# Patient Record
Sex: FEMALE | Race: WHITE | NOT HISPANIC OR LATINO | Employment: OTHER | RURAL
[De-identification: names, ages, dates, MRNs, and addresses within clinical notes are randomized per-mention and may not be internally consistent; named-entity substitution may affect disease eponyms.]

---

## 2017-06-07 ENCOUNTER — OFFICE VISIT (OUTPATIENT)
Dept: CARDIOLOGY | Facility: CLINIC | Age: 62
End: 2017-06-07

## 2017-06-07 VITALS
DIASTOLIC BLOOD PRESSURE: 82 MMHG | WEIGHT: 169 LBS | RESPIRATION RATE: 20 BRPM | HEART RATE: 65 BPM | BODY MASS INDEX: 31.1 KG/M2 | HEIGHT: 62 IN | SYSTOLIC BLOOD PRESSURE: 160 MMHG

## 2017-06-07 DIAGNOSIS — I05.1 RHEUMATIC MITRAL REGURGITATION: Primary | ICD-10-CM

## 2017-06-07 DIAGNOSIS — I49.3 FREQUENT PVCS: ICD-10-CM

## 2017-06-07 DIAGNOSIS — I05.0 RHEUMATIC MITRAL STENOSIS: ICD-10-CM

## 2017-06-07 PROCEDURE — 99213 OFFICE O/P EST LOW 20 MIN: CPT | Performed by: INTERNAL MEDICINE

## 2017-06-07 RX ORDER — ASPIRIN 81 MG/1
81 TABLET ORAL DAILY
COMMUNITY
End: 2023-01-12

## 2017-06-07 NOTE — PROGRESS NOTES
"Date of Office Visit: 2017  Encounter Provider: Georges Esqueda MD  Place of Service: [facility] CARDIOLOGY  Patient Name: Yashira Macias  :1955    Chief complaint: Follow-up for mitral regurgitation, mitral stenosis, and frequent PVC's    History of Present Illness:    Dear Dr. Bledsoe:      I again had the pleasure of seeing your patient in cardiology office on 2017.  As you   well know, she is a very pleasant 62 year-old white female with a past medical history   significant for mitral valve stenosis, mitral regurgitation, and frequent PVCs who presents   for follow-up. The patient initially saw me on 2016. She had been experiencing   palpitations which were mild in nature and she described these as if her heart were   \"skipping beats.\" She had not had any chest discomfort or shortness of breath. She   underwent an exercise treadmill stress test and 2D echocardiogram on 2016. Her   stress test showed horizontal ST depression suggestive of ischemia. Her   echocardiogram showed possible mitral stenosis with a peak gradient of 25 mmHg and a   mean gradient of 11 mmHg. She also had a Holter monitor performed on 2016   which showed very frequent PVCs with a total of 12,909 isolated events. She also had 2   very brief runs of SVT, 8 and 10 beats respectively. She underwent a transesophageal   echocardiogram to further evaluate the mitral valve on 2016. This showed an   ejection fraction of 60% to 65% with grade 2 diastolic dysfunction. However, the posterior   mitral valve leaflet was tethered and fixed, resulting in malcoaptation of the mitral valve   leaflets. She had moderate to severe mitral regurgitation, and significant mitral stenosis   with a mean gradient of 9.5 mmHg. The degree of mitral stenosis was felt to be   overestimated secondary to the mitral regurgitation. She also had a likely fibroelastoma   on the noncoronary cusp of the " aortic valve, as well as a calculated right ventricular   systolic pressure of 65 mmHg.       The patient presents today for follow-up.  She has had no palpitations or increasing   shortness of breath.  She has had no chest pain.  Overall, she has been doing well.    Past Medical History:   Diagnosis Date   • Frequent PVCs    • Hypothyroidism    • Mitral regurgitation     Moderate to severe MR by DAO on 5/4/16   • Mitral stenosis     Moderate to severe by DAO 5/4/16 - mean gradient 9.5 mm Hg (possibly falsely elevated secondary to MR).    • Papillary fibroelastoma of heart     Small fibroelastoma attached to left coronary cusp of arotic valve by DAO 5/4/16       Past Surgical History:   Procedure Laterality Date   • CARPAL TUNNEL RELEASE     • HYSTERECTOMY         Current Outpatient Prescriptions on File Prior to Visit   Medication Sig Dispense Refill   • amitriptyline (ELAVIL) 150 MG tablet TAKE 1 TABLET BY MOUTH AT BEDTIME  3   • atenolol (TENORMIN) 25 MG tablet 25 mg 2 (Two) Times a Day.  1   • levothyroxine (SYNTHROID, LEVOTHROID) 75 MCG tablet TAKE 1 TABLET BY MOUTH EVERY DAY  3     No current facility-administered medications on file prior to visit.      Allergies as of 06/07/2017 - Abelardo as Reviewed 06/07/2017   Allergen Reaction Noted   • Sulfa antibiotics  04/20/2016     Social History     Social History   • Marital status:      Spouse name: N/A   • Number of children: N/A   • Years of education: N/A     Occupational History   • Not on file.     Social History Main Topics   • Smoking status: Current Every Day Smoker     Packs/day: 1.00   • Smokeless tobacco: Never Used      Comment: Smoked for over 40 years    • Alcohol use No   • Drug use: No   • Sexual activity: Not on file     Other Topics Concern   • Not on file     Social History Narrative     Family History   Problem Relation Age of Onset   • Diabetes Brother        Review of Systems   All other systems reviewed and are negative.    Objective:  "    Vitals:    06/07/17 1223   BP: 160/82   Pulse: 65   Resp: 20   Weight: 169 lb (76.7 kg)   Height: 62\" (157.5 cm)     Body mass index is 30.91 kg/(m^2).    Physical Exam   Constitutional: She is oriented to person, place, and time. She appears well-developed and well-nourished.   HENT:   Head: Normocephalic and atraumatic.   Eyes: Conjunctivae are normal.   Neck: Neck supple.   Cardiovascular: Normal rate and regular rhythm.  Exam reveals no gallop and no friction rub.    Murmur heard.   Harsh systolic murmur is present with a grade of 2/6  at the lower left sternal border, apex  Pulmonary/Chest: Effort normal and breath sounds normal.   Abdominal: Soft. There is no tenderness.   Musculoskeletal: She exhibits no edema.   Neurological: She is alert and oriented to person, place, and time.   Skin: Skin is warm.   Psychiatric: She has a normal mood and affect. Her behavior is normal.     Lab Review:   Procedures    Cardiac Procedures:  1. 24-hour Holter monitor on 04/19/2016 showed very frequent premature ventricular   contractions with a total of 12,909 events. She did have 2 couplets and 1250 bigeminy   runs. She also had 2 short runs of SVT at 8 and 10 beats respectively.   2. Transesophageal echocardiogram on 05/04/2016 showed an ejection fraction of 60%.   There was grade 2 diastolic dysfunction. The left atrium was moderately dilated. There   was no evidence of shunting. The right ventricle was normal in size and function. The   posterior mitral valve leaflet was tethered and fixed, resulting in malcoaptation of the mitral   valve leaflets. There was moderate to severe mitral regurgitation. There was up to   moderate to severe mitral stenosis with a mean gradient of 9.5 mmHg (however, this was   possibly overestimated secondary to the mitral regurgitation). There was a small   fibroelastoma on the left coronary cusp of the aortic valve. There was mild tricuspid   regurgitation with a calculated right " ventricular systolic pressure of 65 mmHg.    3. Echocardiogram on 12/21/2016 revealed mild LVH.  The ejection fraction was 60-65%.    There was grade II diastolic dysfunction.  The left atrium was moderately dilated.  The   mitral valve leaflets were thickened and possibly rheumatic.  There was doming of the   anterior mitral valve leaflet.  There was moderate mitral regurgitation which was likely   underestimated.      Assessment:       Diagnosis Plan   1. Rheumatic mitral regurgitation     2. Rheumatic mitral stenosis     3. Frequent PVCs       Plan:       The patient seems to be fairly asymptomatic at this time.  She has had no increasing   shortness of breath, and is still able to exert  herself without any difficulty.  She has   not had any significant palpitations.  Her echocardiogram in 12/2016 revealed   moderate mitral stenosis and at least moderate mitral regurgitation.  I am going to   plan on another echocardiogram in approximately six months to reassess this.  Her   mitral valve does appear to be rheumatic.  She will continue on the atenolol at 25 mg   twice a day for now.  I do not feel the PVCs are causing any issues currently.  I will   see her back in six months, with an echocardiogram shortly afterwards.

## 2017-11-28 DIAGNOSIS — I34.2 NON-RHEUMATIC MITRAL VALVE STENOSIS: Primary | ICD-10-CM

## 2017-11-28 DIAGNOSIS — I05.0 MITRAL VALVE STENOSIS, UNSPECIFIED ETIOLOGY: ICD-10-CM

## 2017-11-28 DIAGNOSIS — I34.0 MITRAL VALVE INSUFFICIENCY, UNSPECIFIED ETIOLOGY: ICD-10-CM

## 2017-12-20 ENCOUNTER — APPOINTMENT (OUTPATIENT)
Dept: CARDIOLOGY | Facility: HOSPITAL | Age: 62
End: 2017-12-20
Attending: INTERNAL MEDICINE

## 2017-12-20 ENCOUNTER — OFFICE VISIT (OUTPATIENT)
Dept: CARDIOLOGY | Facility: CLINIC | Age: 62
End: 2017-12-20

## 2017-12-20 VITALS
HEART RATE: 77 BPM | WEIGHT: 173.8 LBS | DIASTOLIC BLOOD PRESSURE: 78 MMHG | BODY MASS INDEX: 31.98 KG/M2 | SYSTOLIC BLOOD PRESSURE: 148 MMHG | HEIGHT: 62 IN

## 2017-12-20 DIAGNOSIS — I05.1 RHEUMATIC MITRAL REGURGITATION: Primary | ICD-10-CM

## 2017-12-20 DIAGNOSIS — I05.0 RHEUMATIC MITRAL STENOSIS: ICD-10-CM

## 2017-12-20 DIAGNOSIS — I49.3 FREQUENT PVCS: ICD-10-CM

## 2017-12-20 PROCEDURE — 99214 OFFICE O/P EST MOD 30 MIN: CPT | Performed by: INTERNAL MEDICINE

## 2017-12-20 RX ORDER — METFORMIN HYDROCHLORIDE 500 MG/1
TABLET, EXTENDED RELEASE ORAL
Refills: 11 | Status: ON HOLD | COMMUNITY
Start: 2017-12-01 | End: 2021-05-06

## 2017-12-23 NOTE — PROGRESS NOTES
"Date of Office Visit: 2017  Encounter Provider: Georges Esqueda MD  Place of Service: [facility] CARDIOLOGY  Patient Name: Yashira Macias  :1955    Chief complaint: Follow-up for mitral regurgitation, mitral stenosis, and frequent   PVC's    History of Present Illness:    Dear Dr. Bledsoe:      I again had the pleasure of seeing your patient in cardiology office on 2017.  As you   well know, she is a very pleasant 62 year-old white female with a past medical history   significant for mitral valve stenosis, mitral regurgitation, and frequent PVCs who presents   for follow-up. The patient initially saw me on 2016. She had been experiencing   palpitations which were mild in nature and she described these as if her heart were   \"skipping beats.\" She had not had any chest discomfort or shortness of breath. She   underwent an exercise treadmill stress test and 2D echocardiogram on 2016. Her   stress test showed horizontal ST depression suggestive of ischemia. Her   echocardiogram showed possible mitral stenosis with a peak gradient of 25 mmHg and a   mean gradient of 11 mmHg. She also had a Holter monitor performed on 2016   which showed very frequent PVCs with a total of 12,909 isolated events. She also had 2   very brief runs of SVT, 8 and 10 beats respectively. She underwent a transesophageal   echocardiogram to further evaluate the mitral valve on 2016. This showed an   ejection fraction of 60% to 65% with grade 2 diastolic dysfunction. However, the posterior   mitral valve leaflet was tethered and fixed, resulting in malcoaptation of the mitral valve   leaflets. She had moderate to severe mitral regurgitation, and significant mitral stenosis   with a mean gradient of 9.5 mmHg. The degree of mitral stenosis was felt to be   overestimated secondary to the mitral regurgitation. She also had a likely fibroelastoma   on the noncoronary cusp of " the aortic valve, as well as a calculated right ventricular   systolic pressure of 65 mmHg.       The patient presents today for follow-up.  Overall, she is doing well.  She still denies any   significant dyspnea on exertion.  She has not had any significant palpitations.  She is   asymptomatic with regards to the PVCs.  She also denied any chest pain.    Past Medical History:   Diagnosis Date   • Frequent PVCs    • Hypothyroidism    • Mitral regurgitation     Moderate to severe MR by DAO on 5/4/16   • Mitral stenosis     Moderate to severe by DAO 5/4/16 - mean gradient 9.5 mm Hg (possibly falsely elevated secondary to MR).    • Papillary fibroelastoma of heart     Small fibroelastoma attached to left coronary cusp of arotic valve by DAO 5/4/16       Past Surgical History:   Procedure Laterality Date   • CARPAL TUNNEL RELEASE     • HYSTERECTOMY         Current Outpatient Prescriptions on File Prior to Visit   Medication Sig Dispense Refill   • amitriptyline (ELAVIL) 150 MG tablet TAKE 1 TABLET BY MOUTH AT BEDTIME  3   • aspirin 81 MG EC tablet Take 81 mg by mouth Daily.     • atenolol (TENORMIN) 25 MG tablet 25 mg 2 (Two) Times a Day.  1   • levothyroxine (SYNTHROID, LEVOTHROID) 75 MCG tablet TAKE 1 TABLET BY MOUTH EVERY DAY  3     No current facility-administered medications on file prior to visit.      Allergies as of 12/20/2017 - Abelardo as Reviewed 12/20/2017   Allergen Reaction Noted   • Sulfa antibiotics Hives 04/20/2016     Social History     Social History   • Marital status:      Spouse name: N/A   • Number of children: N/A   • Years of education: N/A     Occupational History   • Not on file.     Social History Main Topics   • Smoking status: Current Every Day Smoker     Packs/day: 1.00   • Smokeless tobacco: Never Used      Comment: Smoked for over 40 years    • Alcohol use No      Comment: 1 can caffeine daily   • Drug use: No   • Sexual activity: Not on file     Other Topics Concern   • Not on file  "    Social History Narrative     Family History   Problem Relation Age of Onset   • Diabetes Brother        Review of Systems   Constitution: Positive for malaise/fatigue.   All other systems reviewed and are negative.    Objective:     Vitals:    12/20/17 1003   BP: 148/78   BP Location: Left arm   Pulse: 77   Weight: 78.8 kg (173 lb 12.8 oz)   Height: 157.5 cm (62\")     Body mass index is 31.79 kg/(m^2).    Physical Exam   Constitutional: She is oriented to person, place, and time. She appears well-developed and well-nourished.   HENT:   Head: Normocephalic and atraumatic.   Eyes: Conjunctivae are normal.   Neck: Neck supple.   Cardiovascular: Normal rate and regular rhythm.  Exam reveals no gallop and no friction rub.    Murmur heard.   Harsh systolic murmur is present with a grade of 2/6  at the lower left sternal border, apex  Pulmonary/Chest: Effort normal and breath sounds normal.   Abdominal: Soft. There is no tenderness.   Musculoskeletal: She exhibits no edema.   Neurological: She is alert and oriented to person, place, and time.   Skin: Skin is warm.   Psychiatric: She has a normal mood and affect. Her behavior is normal.     Lab Review:   Procedures    Cardiac Procedures:  1. 24-hour Holter monitor on 04/19/2016 showed very frequent premature ventricular   contractions with a total of 12,909 events. She did have 2 couplets and 1250 bigeminy   runs. She also had 2 short runs of SVT at 8 and 10 beats respectively.   2. Transesophageal echocardiogram on 05/04/2016 showed an ejection fraction of 60%.   There was grade 2 diastolic dysfunction. The left atrium was moderately dilated. There   was no evidence of shunting. The right ventricle was normal in size and function. The   posterior mitral valve leaflet was tethered and fixed, resulting in malcoaptation of the mitral   valve leaflets. There was moderate to severe mitral regurgitation. There was up to   moderate to severe mitral stenosis with a mean " gradient of 9.5 mmHg (however, this was   possibly overestimated secondary to the mitral regurgitation). There was a small   fibroelastoma on the left coronary cusp of the aortic valve. There was mild tricuspid   regurgitation with a calculated right ventricular systolic pressure of 65 mmHg.    3. Echocardiogram on 12/21/2016 revealed mild LVH.  The ejection fraction was 60-65%.    There was grade II diastolic dysfunction.  The left atrium was moderately dilated.  The   mitral valve leaflets were thickened and possibly rheumatic.  There was doming of the   anterior mitral valve leaflet.  There was moderate mitral regurgitation which was likely   underestimated.      Assessment:       Diagnosis Plan   1. Rheumatic mitral regurgitation     2. Rheumatic mitral stenosis     3. Frequent PVCs       Plan:       The patient is doing well at this point.  She is completely asymptomatic from the PVCs.    She also does not have any symptoms from her mitral valve disease.  She had an   echocardiogram performed earlier today, and I am going to review this in detail.  For now,   I will keep her on the atenolol at 25 mg twice a day.  Her heart rate and blood pressure   have been good at home per her account.  Again, I do suspect that she likely has had   rheumatic fever in the past, although she is not aware of this.  At this point, unless there   are issues with her left ventricle, there is no indication for surgical intervention on the   mitral valve.  However, this will need to be watched closely.  I will plan on seeing her   back in the office in 6 months unless other issues arise.

## 2018-06-20 ENCOUNTER — OFFICE VISIT (OUTPATIENT)
Dept: CARDIOLOGY | Facility: CLINIC | Age: 63
End: 2018-06-20

## 2018-06-20 VITALS
WEIGHT: 171 LBS | BODY MASS INDEX: 31.47 KG/M2 | HEART RATE: 68 BPM | SYSTOLIC BLOOD PRESSURE: 132 MMHG | DIASTOLIC BLOOD PRESSURE: 62 MMHG | HEIGHT: 62 IN

## 2018-06-20 DIAGNOSIS — I05.1 RHEUMATIC MITRAL REGURGITATION: Primary | ICD-10-CM

## 2018-06-20 DIAGNOSIS — I49.3 FREQUENT PVCS: ICD-10-CM

## 2018-06-20 DIAGNOSIS — I05.0 RHEUMATIC MITRAL STENOSIS: ICD-10-CM

## 2018-06-20 PROCEDURE — 99213 OFFICE O/P EST LOW 20 MIN: CPT | Performed by: INTERNAL MEDICINE

## 2018-06-20 RX ORDER — LEVOTHYROXINE SODIUM 150 UG/1
150 CAPSULE ORAL DAILY
COMMUNITY
End: 2019-12-04 | Stop reason: DRUGHIGH

## 2018-06-20 NOTE — PROGRESS NOTES
"Date of Office Visit: 2018  Encounter Provider: Georges Esqueda MD  Place of Service: [facility] CARDIOLOGY  Patient Name: Yashira Macias  :1955    Chief complaint: Follow-up for rheumatic mitral regurgitation and mitral stenosis,   frequent PVCs.    History of Present Illness:        I again had the pleasure of seeing your patient in cardiology office on 2018.  As you   well know, she is a very pleasant 63 year-old white female with a past medical history   significant for mitral valve stenosis, mitral regurgitation, and frequent PVCs who presents   for follow-up. The patient initially saw me on 2016. She had been experiencing   palpitations which were mild in nature and she described these as if her heart were   \"skipping beats.\" She had not had any chest discomfort or shortness of breath. She   underwent an exercise treadmill stress test and 2D echocardiogram on 2016. Her   stress test showed horizontal ST depression suggestive of ischemia. Her   echocardiogram showed possible mitral stenosis with a peak gradient of 25 mmHg and a   mean gradient of 11 mmHg. She also had a Holter monitor performed on 2016   which showed very frequent PVCs with a total of 12,909 isolated events. She also had 2   very brief runs of SVT, 8 and 10 beats respectively. She underwent a transesophageal   echocardiogram to further evaluate the mitral valve on 2016. This showed an   ejection fraction of 60% to 65% with grade 2 diastolic dysfunction. However, the posterior   mitral valve leaflet was tethered and fixed, resulting in malcoaptation of the mitral valve   leaflets. She had moderate to severe mitral regurgitation, and significant mitral stenosis   with a mean gradient of 9.5 mmHg. The degree of mitral stenosis was felt to be   overestimated secondary to the mitral regurgitation. She also had a likely fibroelastoma   on the noncoronary cusp of the " aortic valve, as well as a calculated right ventricular   systolic pressure of 65 mmHg.       The patient presents today for follow-up.  She is still asymptomatic, and has had no   significant shortness of breath or palpitations.  She denied any chest discomfort.  She   does state that her thyroid medications are currently being adjusted, and she has had   some fatigue from this.    Past Medical History:   Diagnosis Date   • Frequent PVCs    • Hypothyroidism    • Mitral regurgitation     Moderate to severe MR by DAO on 5/4/16   • Mitral stenosis     Moderate to severe by DAO 5/4/16 - mean gradient 9.5 mm Hg (possibly falsely elevated secondary to MR).    • Papillary fibroelastoma of heart     Small fibroelastoma attached to left coronary cusp of arotic valve by DAO 5/4/16       Past Surgical History:   Procedure Laterality Date   • CARPAL TUNNEL RELEASE     • HYSTERECTOMY         Current Outpatient Prescriptions on File Prior to Visit   Medication Sig Dispense Refill   • amitriptyline (ELAVIL) 150 MG tablet TAKE 1 TABLET BY MOUTH AT BEDTIME  3   • aspirin 81 MG EC tablet Take 81 mg by mouth Daily.     • atenolol (TENORMIN) 25 MG tablet 25 mg 2 (Two) Times a Day.  1   • metFORMIN ER (GLUCOPHAGE-XR) 500 MG 24 hr tablet TAKE 1 TABLET BY MOUTH EVERY MORNING  11   • [DISCONTINUED] levothyroxine (SYNTHROID, LEVOTHROID) 75 MCG tablet 125 mcg. TAKE 1 TABLET BY MOUTH EVERY DAY  3     No current facility-administered medications on file prior to visit.      Allergies as of 06/20/2018 - Reviewed 06/20/2018   Allergen Reaction Noted   • Sulfa antibiotics Hives 04/20/2016     Social History     Social History   • Marital status:      Spouse name: N/A   • Number of children: N/A   • Years of education: N/A     Occupational History   • Not on file.     Social History Main Topics   • Smoking status: Current Every Day Smoker     Packs/day: 1.00   • Smokeless tobacco: Never Used      Comment: Smoked for over 40 years    •  "Alcohol use No      Comment: 1 can caffeine daily   • Drug use: No   • Sexual activity: Not on file     Other Topics Concern   • Not on file     Social History Narrative   • No narrative on file     Family History   Problem Relation Age of Onset   • Diabetes Brother        Review of Systems   Constitution: Positive for malaise/fatigue.   All other systems reviewed and are negative.     Objective:     Vitals:    06/20/18 0910   BP: 132/62   Pulse: 68   Weight: 77.6 kg (171 lb)   Height: 157.5 cm (62\")     Body mass index is 31.28 kg/m².    Physical Exam   Constitutional: She is oriented to person, place, and time. She appears well-developed and well-nourished.   HENT:   Head: Normocephalic and atraumatic.   Eyes: Conjunctivae are normal.   Neck: Neck supple.   Cardiovascular: Normal rate and regular rhythm.  Exam reveals no gallop and no friction rub.    Murmur heard.   Systolic murmur is present with a grade of 2/6  at the lower left sternal border, apex  Pulmonary/Chest: Effort normal and breath sounds normal.   Abdominal: Soft. There is no tenderness.   Musculoskeletal: She exhibits no edema.   Neurological: She is alert and oriented to person, place, and time.   Skin: Skin is warm.   Psychiatric: She has a normal mood and affect. Her behavior is normal.     Lab Review:   Procedures    Cardiac Procedures:  1. 24-hour Holter monitor on 04/19/2016 showed very frequent premature ventricular   contractions with a total of 12,909 events. She did have 2 couplets and 1250 bigeminy   runs. She also had 2 short runs of SVT at 8 and 10 beats respectively.   2. Transesophageal echocardiogram on 05/04/2016 showed an ejection fraction of 60%.   There was grade 2 diastolic dysfunction. The left atrium was moderately dilated. There   was no evidence of shunting. The right ventricle was normal in size and function. The   posterior mitral valve leaflet was tethered and fixed, resulting in malcoaptation of the mitral   valve " leaflets. There was moderate to severe mitral regurgitation. There was up to   moderate to severe mitral stenosis with a mean gradient of 9.5 mmHg (however, this was   possibly overestimated secondary to the mitral regurgitation). There was a small   fibroelastoma on the left coronary cusp of the aortic valve. There was mild tricuspid   regurgitation with a calculated right ventricular systolic pressure of 65 mmHg.    3. Echocardiogram on 12/21/2016 revealed mild LVH.  The ejection fraction was 60-65%.    There was grade II diastolic dysfunction.  The left atrium was moderately dilated.  The   mitral valve leaflets were thickened and possibly rheumatic.  There was doming of the   anterior mitral valve leaflet.  There was moderate mitral regurgitation which was likely   underestimated.    4. Echocardiogram on 12/20/2017: There is mild LVH.  The ejection fraction is 60-65%.    There was grade 2 diastolic dysfunction.  The left atrium was moderately dilated.  The   mitral valve leaflets were thickened and rheumatic.  There was moderate mitral stenosis   with a mean gradient of 7 mmHg.  There was moderate mitral regurgitation.    Assessment:       Diagnosis Plan   1. Rheumatic mitral regurgitation     2. Rheumatic mitral stenosis     3. Frequent PVCs       Plan:       The patient is still asymptomatic at this point.  However, given the mitral valve disease and   the rheumatic changes, I would still recommend an echocardiogram every year. I am going   to have an echocardiogram checked in 6 months at her follow-up appointment.  She will   continue on the atenolol at 25 mg twice a day.  She has not had any palpitations from the   PVCs.  At this point, she is not having symptoms, and does not need surgical intervention   for her mitral valve.  However, this will need to be watched closely.  I will see her back in 6   months.

## 2018-12-05 ENCOUNTER — OFFICE VISIT (OUTPATIENT)
Dept: CARDIOLOGY | Facility: CLINIC | Age: 63
End: 2018-12-05

## 2018-12-05 VITALS
HEIGHT: 62 IN | BODY MASS INDEX: 32.2 KG/M2 | WEIGHT: 175 LBS | HEART RATE: 71 BPM | SYSTOLIC BLOOD PRESSURE: 128 MMHG | OXYGEN SATURATION: 97 % | DIASTOLIC BLOOD PRESSURE: 68 MMHG

## 2018-12-05 DIAGNOSIS — I05.1 RHEUMATIC MITRAL REGURGITATION: Primary | ICD-10-CM

## 2018-12-05 DIAGNOSIS — I49.3 FREQUENT PVCS: ICD-10-CM

## 2018-12-05 DIAGNOSIS — I05.0 RHEUMATIC MITRAL STENOSIS: ICD-10-CM

## 2018-12-05 PROCEDURE — 99213 OFFICE O/P EST LOW 20 MIN: CPT | Performed by: INTERNAL MEDICINE

## 2018-12-10 NOTE — PROGRESS NOTES
"Date of Office Visit: 2018  Encounter Provider: Georges Esqueda MD  Place of Service: [facility] CARDIOLOGY  Patient Name: Yashira Macias  :1955    Chief complaint: Follow-up for rheumatic mitral regurgitation and mitral stenosis,   frequent PVCs.    History of Present Illness:    I again had the pleasure of seeing your patient in cardiology office on 2018.  As you   well know, she is a very pleasant 63 year-old white female with a past medical history   significant for mitral valve stenosis, mitral regurgitation, and frequent PVCs who presents   for follow-up. The patient initially saw me on 2016. She had been experiencing   palpitations which were mild in nature and she described these as if her heart were   \"skipping beats.\" She had not had any chest discomfort or shortness of breath. She   underwent an exercise treadmill stress test and 2D echocardiogram on 2016. Her   stress test showed horizontal ST depression suggestive of ischemia. Her   echocardiogram showed possible mitral stenosis with a peak gradient of 25 mmHg and a   mean gradient of 11 mmHg. She also had a Holter monitor performed on 2016   which showed very frequent PVCs with a total of 12,909 isolated events. She also had 2   very brief runs of SVT, 8 and 10 beats respectively. She underwent a transesophageal   echocardiogram to further evaluate the mitral valve on 2016. This showed an   ejection fraction of 60% to 65% with grade 2 diastolic dysfunction. However, the posterior   mitral valve leaflet was tethered and fixed, resulting in malcoaptation of the mitral valve   leaflets. She had moderate to severe mitral regurgitation, and significant mitral stenosis   with a mean gradient of 9.5 mmHg. The degree of mitral stenosis was felt to be   overestimated secondary to the mitral regurgitation. She also had a likely fibroelastoma   on the noncoronary cusp of the aortic " "valve, as well as a calculated right ventricular   systolic pressure of 65 mmHg.     The patient presents today for follow-up.  She continues to be asymptomatic from the   valvular disease. She has a slight shortness of breath which has not changed significantly.    She has not felt any palpitations recently.  She does tell me that her thyroid has been \"off\",   and this is being addressed.      Past Medical History:   Diagnosis Date   • Frequent PVCs    • Hypothyroidism    • Mitral regurgitation     Moderate to severe MR by DAO on 5/4/16   • Mitral stenosis     Moderate to severe by DAO 5/4/16 - mean gradient 9.5 mm Hg (possibly falsely elevated secondary to MR).    • Papillary fibroelastoma of heart     Small fibroelastoma attached to left coronary cusp of arotic valve by DAO 5/4/16       Past Surgical History:   Procedure Laterality Date   • CARPAL TUNNEL RELEASE     • HYSTERECTOMY         Current Outpatient Medications on File Prior to Visit   Medication Sig Dispense Refill   • amitriptyline (ELAVIL) 150 MG tablet TAKE 1 TABLET BY MOUTH AT BEDTIME  3   • aspirin 81 MG EC tablet Take 81 mg by mouth Daily.     • atenolol (TENORMIN) 25 MG tablet 25 mg 2 (Two) Times a Day.  1   • levothyroxine sodium (TIROSINT) 150 MCG capsule Take 150 mcg by mouth Daily.     • metFORMIN ER (GLUCOPHAGE-XR) 500 MG 24 hr tablet TAKE 1 TABLET BY MOUTH EVERY MORNING  11     No current facility-administered medications on file prior to visit.      Allergies as of 12/05/2018 - Reviewed 06/20/2018   Allergen Reaction Noted   • Sulfa antibiotics Hives 04/20/2016     Social History     Socioeconomic History   • Marital status:      Spouse name: Not on file   • Number of children: Not on file   • Years of education: Not on file   • Highest education level: Not on file   Social Needs   • Financial resource strain: Not on file   • Food insecurity - worry: Not on file   • Food insecurity - inability: Not on file   • Transportation needs - " "medical: Not on file   • Transportation needs - non-medical: Not on file   Occupational History   • Not on file   Tobacco Use   • Smoking status: Current Every Day Smoker     Packs/day: 1.00   • Smokeless tobacco: Never Used   • Tobacco comment: Smoked for over 40 years    Substance and Sexual Activity   • Alcohol use: No     Comment: 1 can caffeine daily   • Drug use: No   • Sexual activity: Not on file   Other Topics Concern   • Not on file   Social History Narrative   • Not on file     Family History   Problem Relation Age of Onset   • Diabetes Brother        Review of Systems   Constitution: Positive for malaise/fatigue.   Respiratory: Positive for shortness of breath.    All other systems reviewed and are negative.     Objective:     Vitals:    12/05/18 0938   BP: 128/68   Pulse: 71   SpO2: 97%   Weight: 79.4 kg (175 lb)   Height: 157.5 cm (62.01\")     Body mass index is 32 kg/m².    Physical Exam   Constitutional: She is oriented to person, place, and time. She appears well-developed and well-nourished.   HENT:   Head: Normocephalic and atraumatic.   Eyes: Conjunctivae are normal.   Neck: Neck supple.   Cardiovascular: Normal rate and regular rhythm. Exam reveals no gallop and no friction rub.   Murmur heard.   Systolic murmur is present with a grade of 2/6 at the lower left sternal border and apex.  Pulmonary/Chest: Effort normal and breath sounds normal.   Abdominal: Soft. There is no tenderness.   Musculoskeletal: She exhibits no edema.   Neurological: She is alert and oriented to person, place, and time.   Skin: Skin is warm.   Psychiatric: She has a normal mood and affect. Her behavior is normal.     Lab Review:   Procedures    Cardiac Procedures:  1. 24-hour Holter monitor on 04/19/2016 showed very frequent premature ventricular   contractions with a total of 12,909 events. She did have 2 couplets and 1250 bigeminy   runs. She also had 2 short runs of SVT at 8 and 10 beats respectively.   2. " Transesophageal echocardiogram on 05/04/2016 showed an ejection fraction of 60%.   There was grade 2 diastolic dysfunction. The left atrium was moderately dilated. There   was no evidence of shunting. The right ventricle was normal in size and function. The   posterior mitral valve leaflet was tethered and fixed, resulting in malcoaptation of the mitral   valve leaflets. There was moderate to severe mitral regurgitation. There was up to   moderate to severe mitral stenosis with a mean gradient of 9.5 mmHg (however, this was   possibly overestimated secondary to the mitral regurgitation). There was a small   fibroelastoma on the left coronary cusp of the aortic valve. There was mild tricuspid   regurgitation with a calculated right ventricular systolic pressure of 65 mmHg.    3. Echocardiogram on 12/21/2016 revealed mild LVH.  The ejection fraction was 60-65%.    There was grade II diastolic dysfunction.  The left atrium was moderately dilated.  The   mitral valve leaflets were thickened and possibly rheumatic.  There was doming of the   anterior mitral valve leaflet.  There was moderate mitral regurgitation which was likely   underestimated.    4. Echocardiogram on 12/20/2017: There is mild LVH.  The ejection fraction is 60-65%.    There was grade 2 diastolic dysfunction.  The left atrium was moderately dilated.  The   mitral valve leaflets were thickened and rheumatic.  There was moderate mitral stenosis   with a mean gradient of 7 mmHg.  There was moderate mitral regurgitation.      Assessment:       Diagnosis Plan   1. Rheumatic mitral regurgitation  Adult Transthoracic Echo Complete W/ Cont if Necessary Per Protocol   2. Rheumatic mitral stenosis  Adult Transthoracic Echo Complete W/ Cont if Necessary Per Protocol   3. Frequent PVCs       Plan:       The patient continues to be fairly asymptomatic.  She has baseline shortness of breath, but   continues to smoke one pack of cigarettes per day.  The shortness of  breath has not changed   recently, and she has not had any other symptoms.  She does not feel the PVCs, and does   not have palpitations.  Her last echocardiogram showed moderate mitral stenosis and   moderate mitral regurgitation.  Given the rheumatic changes on the valve, I have   recommended a yearly echo.  She is now due for an echocardiogram, and I will have this set   up.  She will continue on the atenolol at 25 mg twice a day.  She is getting her Synthroid   adjusted to better regulate her thyroid.  I will plan on seeing her back in 6 months unless the   echocardiogram shows any new changes.

## 2019-06-05 ENCOUNTER — OFFICE VISIT (OUTPATIENT)
Dept: CARDIOLOGY | Facility: CLINIC | Age: 64
End: 2019-06-05

## 2019-06-05 VITALS
DIASTOLIC BLOOD PRESSURE: 56 MMHG | SYSTOLIC BLOOD PRESSURE: 122 MMHG | HEART RATE: 63 BPM | BODY MASS INDEX: 32.2 KG/M2 | WEIGHT: 175 LBS | HEIGHT: 62 IN

## 2019-06-05 DIAGNOSIS — I49.3 PVC'S (PREMATURE VENTRICULAR CONTRACTIONS): ICD-10-CM

## 2019-06-05 DIAGNOSIS — I05.0 RHEUMATIC MITRAL STENOSIS: ICD-10-CM

## 2019-06-05 DIAGNOSIS — R00.2 PALPITATIONS: Primary | ICD-10-CM

## 2019-06-05 DIAGNOSIS — I05.1 RHEUMATIC MITRAL REGURGITATION: ICD-10-CM

## 2019-06-05 PROCEDURE — 99214 OFFICE O/P EST MOD 30 MIN: CPT | Performed by: INTERNAL MEDICINE

## 2019-06-05 NOTE — PROGRESS NOTES
"Date of Office Visit: 2019  Encounter Provider: Georges Esqueda MD  Place of Service: [facility] CARDIOLOGY  Patient Name: Yashira Macias  :1955    Chief complaint: Follow-up for rheumatic mitral regurgitation and mitral stenosis,   frequent PVCs.    History of Present Illness:      I again had the pleasure of seeing your patient in cardiology office on 2019.  As you   well know, she is a very pleasant 64 year-old white female with a past medical history   significant for mitral valve stenosis, mitral regurgitation, and frequent PVCs who presents   for follow-up. The patient initially saw me on 2016. She had been experiencing   palpitations which were mild in nature and she described these as if her heart were   \"skipping beats.\" She had not had any chest discomfort or shortness of breath. She   underwent an exercise treadmill stress test and 2D echocardiogram on 2016. Her   stress test showed horizontal ST depression suggestive of ischemia. Her   echocardiogram showed possible mitral stenosis with a peak gradient of 25 mmHg and a   mean gradient of 11 mmHg. She also had a Holter monitor performed on 2016   which showed very frequent PVCs with a total of 12,909 isolated events. She also had 2   very brief runs of SVT, 8 and 10 beats respectively.     She underwent a transesophageal echocardiogram to further evaluate the mitral valve   on 2016. This showed an ejection fraction of 60% to 65% with grade 2 diastolic   dysfunction. However, the posterior mitral valve leaflet was tethered and fixed, resulting   in malcoaptation of the mitral valve leaflets. She had moderate to severe mitral   regurgitation, and significant mitral stenosis with a mean gradient of 9.5 mmHg. The   degree of mitral stenosis was felt to be overestimated secondary to the mitral  regurgitation. She also had a likely fibroelastoma on the noncoronary cusp of the " aortic  valve, as well as a calculated right ventricular systolic pressure of 65 mmHg.      The patient presents today for follow-up.  Her only complaint is that she did have   palpitations for about 10 to 15 minutes yesterday.  This is the first time she had felt   palpitations in quite some time.  She has not had any chest pain or new shortness of   breath.  She is still smoking.      Past Medical History:   Diagnosis Date   • Frequent PVCs    • Hypothyroidism    • Mitral regurgitation    • Mitral stenosis    • Papillary fibroelastoma of heart     Small fibroelastoma attached to left coronary cusp of arotic valve by DAO 5/4/16       Past Surgical History:   Procedure Laterality Date   • CARPAL TUNNEL RELEASE     • HYSTERECTOMY         Current Outpatient Medications on File Prior to Visit   Medication Sig Dispense Refill   • Levothyroxine Sodium 175 MCG/ML solution Take  by mouth.     • amitriptyline (ELAVIL) 150 MG tablet TAKE 1 TABLET BY MOUTH AT BEDTIME  3   • aspirin 81 MG EC tablet Take 81 mg by mouth Daily.     • atenolol (TENORMIN) 25 MG tablet 25 mg 2 (Two) Times a Day.  1   • levothyroxine sodium (TIROSINT) 150 MCG capsule Take 150 mcg by mouth Daily.     • metFORMIN ER (GLUCOPHAGE-XR) 500 MG 24 hr tablet TAKE 1 TABLET BY MOUTH EVERY MORNING  11     No current facility-administered medications on file prior to visit.      Allergies as of 06/05/2019 - Reviewed 06/05/2019   Allergen Reaction Noted   • Sulfa antibiotics Hives 04/20/2016     Social History     Socioeconomic History   • Marital status:      Spouse name: Not on file   • Number of children: Not on file   • Years of education: Not on file   • Highest education level: Not on file   Tobacco Use   • Smoking status: Current Every Day Smoker     Packs/day: 1.00   • Smokeless tobacco: Never Used   • Tobacco comment: Smoked for over 40 years    Substance and Sexual Activity   • Alcohol use: No     Comment: 1 can caffeine daily   • Drug use: No  "    Family History   Problem Relation Age of Onset   • Diabetes Brother        Review of Systems   Cardiovascular: Positive for palpitations.   All other systems reviewed and are negative.     Objective:     Vitals:    06/05/19 0918   BP: 122/56   Pulse: 63   Weight: 79.4 kg (175 lb)   Height: 157.5 cm (62\")     Body mass index is 32.01 kg/m².    Physical Exam   Constitutional: She is oriented to person, place, and time. She appears well-developed and well-nourished.   HENT:   Head: Normocephalic and atraumatic.   Eyes: Conjunctivae are normal.   Neck: Neck supple.   Cardiovascular: Normal rate and regular rhythm. Exam reveals no gallop and no friction rub.   Murmur heard.   Systolic murmur is present with a grade of 2/6 at the lower left sternal border and apex.  Pulmonary/Chest: Effort normal and breath sounds normal.   Abdominal: Soft. There is no tenderness.   Musculoskeletal: She exhibits no edema.   Neurological: She is alert and oriented to person, place, and time.   Skin: Skin is warm.   Psychiatric: She has a normal mood and affect. Her behavior is normal.     Lab Review:   Procedures    Cardiac Procedures:  1. 24-hour Holter monitor on 4/19/2016 showed very frequent premature ventricular   contractions with a total of 12,909 events. She did have 2 couplets and 1250 bigeminy   runs. She also had 2 short runs of SVT at 8 and 10 beats respectively.   2. Transesophageal echocardiogram on 5/4/2016 showed an ejection fraction of 60%.   There was grade 2 diastolic dysfunction. The left atrium was moderately dilated. There   was no evidence of shunting. The right ventricle was normal in size and function. The   posterior mitral valve leaflet was tethered and fixed, resulting in malcoaptation of the mitral   valve leaflets. There was moderate to severe mitral regurgitation. There was up to   moderate to severe mitral stenosis with a mean gradient of 9.5 mmHg (however, this was   possibly overestimated secondary to " the mitral regurgitation). There was a small   fibroelastoma on the left coronary cusp of the aortic valve. There was mild tricuspid   regurgitation with a calculated right ventricular systolic pressure of 65 mmHg.    3. Echocardiogram on 12/21/2016 revealed mild LVH.  The ejection fraction was 60-65%.    There was grade II diastolic dysfunction.  The left atrium was moderately dilated.  The   mitral valve leaflets were thickened and possibly rheumatic.  There was doming of the   anterior mitral valve leaflet.  There was moderate mitral regurgitation which was likely   underestimated.    4. Echocardiogram on 12/20/2017: There is mild LVH.  The ejection fraction is 60-65%.    There was grade 2 diastolic dysfunction.  The left atrium was moderately dilated.  The   mitral valve leaflets were thickened and rheumatic.  There was moderate mitral stenosis   with a mean gradient of 7 mmHg.  There was moderate mitral regurgitation.  5. Echocardiogram on 12/28/2018: There was mild LVH.  Ejection fraction was 60 to 65%.    The left atrium was moderately enlarged.  There was a rheumatic mitral valve.  There was   moderate mitral stenosis with a mean gradient of 6.5 mmHg.  There was moderate mitral   regurgitation which was eccentric and posteriorly directed.  There was mild aortic   insufficiency.    Assessment:       Diagnosis Plan   1. Palpitations  Holter Monitor - 24 Hour   2. PVC's (premature ventricular contractions)  Holter Monitor - 24 Hour   3. Rheumatic mitral stenosis     4. Rheumatic mitral regurgitation       Plan:       At this point, the patient seems to be stable.  The only complaint was that she had palpitations for approximately 15 minutes yesterday.  This is the first time she has felt palpitations in quite some time.  Given the rheumatic mitral stenosis and left atrial enlargement, she would be at high risk for developing atrial fibrillation in the future.  I am going to check a 24-hour Holter monitor at  this time.  If she continues to have palpitations, longer-term monitor would be considered.  She has been largely asymptomatic from the PVCs.  Her last echocardiogram in December 2018 showed moderate mitral stenosis and moderate mitral regurgitation.  She will need a yearly echocardiogram, and I will set this up for next December after I see her.  She will continue on the atenolol.  I did discuss smoking cessation and encouraged her to quit.  I will see her back in the office in 6 months.

## 2019-12-04 ENCOUNTER — OFFICE VISIT (OUTPATIENT)
Dept: CARDIOLOGY | Facility: CLINIC | Age: 64
End: 2019-12-04

## 2019-12-04 VITALS
HEIGHT: 62 IN | HEART RATE: 58 BPM | BODY MASS INDEX: 31.58 KG/M2 | SYSTOLIC BLOOD PRESSURE: 110 MMHG | WEIGHT: 171.6 LBS | DIASTOLIC BLOOD PRESSURE: 74 MMHG

## 2019-12-04 DIAGNOSIS — I05.1 RHEUMATIC MITRAL REGURGITATION: ICD-10-CM

## 2019-12-04 DIAGNOSIS — I05.0 RHEUMATIC MITRAL STENOSIS: Primary | ICD-10-CM

## 2019-12-04 DIAGNOSIS — I49.3 FREQUENT PVCS: ICD-10-CM

## 2019-12-04 PROCEDURE — 99213 OFFICE O/P EST LOW 20 MIN: CPT | Performed by: INTERNAL MEDICINE

## 2019-12-23 NOTE — PROGRESS NOTES
"Date of Office Visit: 2019  Encounter Provider: Georges Esqueda MD  Place of Service: [facility] CARDIOLOGY  Patient Name: Yashira Macias  :1955    Chief complaint: Follow-up for rheumatic mitral regurgitation and mitral stenosis,   frequent PVCs.    History of Present Illness:    I again had the pleasure of seeing your patient in cardiology office on 2019.  As you   well know, she is a very pleasant 64 year-old white female with a past medical history   significant for mitral valve stenosis, mitral regurgitation, and frequent PVCs who presents   for follow-up. The patient initially saw me on 2016. She had been experiencing   palpitations which were mild in nature and she described these as if her heart were   \"skipping beats.\" She had not had any chest discomfort or shortness of breath. She   underwent an exercise treadmill stress test and 2D echocardiogram on 2016. Her   stress test showed horizontal ST depression suggestive of ischemia. Her   echocardiogram showed possible mitral stenosis with a peak gradient of 25 mmHg and a   mean gradient of 11 mmHg. She also had a Holter monitor performed on 2016   which showed very frequent PVCs with a total of 12,909 isolated events. She also had 2   very brief runs of SVT, 8 and 10 beats respectively.      She underwent a transesophageal echocardiogram to further evaluate the mitral valve   on 2016. This showed an ejection fraction of 60% to 65% with grade 2 diastolic   dysfunction. However, the posterior mitral valve leaflet was tethered and fixed, resulting   in malcoaptation of the mitral valve leaflets. She had moderate to severe mitral   regurgitation, and significant mitral stenosis with a mean gradient of 9.5 mmHg. The   degree of mitral stenosis was felt to be overestimated secondary to the mitral  regurgitation. She also had a likely fibroelastoma on the noncoronary cusp of the " aortic  valve, as well as a calculated right ventricular systolic pressure of 65 mmHg.      The patient presents today for follow-up.  Overall, she has been doing well.  She states   that she still has shortness of breath with moderate exertion, although this has not   changed.  She is still smoking cigarettes.  She tells me that her blood pressure has   been good, and it is excellent today at 110/74.  She has not had any chest pain.    Past Medical History:   Diagnosis Date   • Frequent PVCs    • Hypothyroidism    • Mitral regurgitation    • Mitral stenosis    • Papillary fibroelastoma of heart     Small fibroelastoma attached to left coronary cusp of arotic valve by DAO 5/4/16       Past Surgical History:   Procedure Laterality Date   • CARPAL TUNNEL RELEASE     • HYSTERECTOMY         Current Outpatient Medications on File Prior to Visit   Medication Sig Dispense Refill   • amitriptyline (ELAVIL) 150 MG tablet TAKE 1 TABLET BY MOUTH AT BEDTIME  3   • aspirin 81 MG EC tablet Take 81 mg by mouth Daily.     • atenolol (TENORMIN) 25 MG tablet 25 mg 2 (Two) Times a Day.  1   • Levothyroxine Sodium 175 MCG/ML solution Take  by mouth.     • metFORMIN ER (GLUCOPHAGE-XR) 500 MG 24 hr tablet TAKE 1 TABLET BY MOUTH EVERY MORNING  11     No current facility-administered medications on file prior to visit.      Allergies as of 12/04/2019 - Reviewed 06/05/2019   Allergen Reaction Noted   • Sulfa antibiotics Hives 04/20/2016     Social History     Socioeconomic History   • Marital status:      Spouse name: Not on file   • Number of children: Not on file   • Years of education: Not on file   • Highest education level: Not on file   Tobacco Use   • Smoking status: Current Every Day Smoker     Packs/day: 1.00   • Smokeless tobacco: Never Used   • Tobacco comment: Smoked for over 40 years    Substance and Sexual Activity   • Alcohol use: No     Comment: 1 can caffeine daily   • Drug use: No     Family History   Problem  "Relation Age of Onset   • Diabetes Brother        Review of Systems   Cardiovascular: Positive for dyspnea on exertion and palpitations.   All other systems reviewed and are negative.     Objective:     Vitals:    12/04/19 0904   BP: 110/74   Pulse: 58   Weight: 77.8 kg (171 lb 9.6 oz)   Height: 157.5 cm (62.01\")     Body mass index is 31.38 kg/m².    Physical Exam   Constitutional: She is oriented to person, place, and time. She appears well-developed and well-nourished.   HENT:   Head: Normocephalic and atraumatic.   Eyes: Conjunctivae are normal.   Neck: Neck supple.   Cardiovascular: Normal rate and regular rhythm. Exam reveals no gallop and no friction rub.   Murmur heard.   Systolic murmur is present with a grade of 2/6 at the lower left sternal border and apex.  Pulmonary/Chest: Effort normal and breath sounds normal.   Abdominal: Soft. There is no tenderness.   Musculoskeletal: She exhibits no edema.   Neurological: She is alert and oriented to person, place, and time.   Skin: Skin is warm.   Psychiatric: She has a normal mood and affect. Her behavior is normal.     Lab Review:   Procedures    Cardiac Procedures:  1. 24-hour Holter monitor on 4/19/2016 showed very frequent premature ventricular   contractions with a total of 12,909 events. She did have 2 couplets and 1250 bigeminy   runs. She also had 2 short runs of SVT at 8 and 10 beats respectively.   2. Transesophageal echocardiogram on 5/4/2016 showed an ejection fraction of 60%.   There was grade 2 diastolic dysfunction. The left atrium was moderately dilated. There   was no evidence of shunting. The right ventricle was normal in size and function. The   posterior mitral valve leaflet was tethered and fixed, resulting in malcoaptation of the mitral   valve leaflets. There was moderate to severe mitral regurgitation. There was up to   moderate to severe mitral stenosis with a mean gradient of 9.5 mmHg (however, this was   possibly overestimated " secondary to the mitral regurgitation). There was a small   fibroelastoma on the left coronary cusp of the aortic valve. There was mild tricuspid   regurgitation with a calculated right ventricular systolic pressure of 65 mmHg.    3. Echocardiogram on 12/21/2016 revealed mild LVH.  The ejection fraction was 60-65%.    There was grade II diastolic dysfunction.  The left atrium was moderately dilated.  The   mitral valve leaflets were thickened and possibly rheumatic.  There was doming of the   anterior mitral valve leaflet.  There was moderate mitral regurgitation which was likely   underestimated.    4. Echocardiogram on 12/20/2017: There is mild LVH.  The ejection fraction is 60-65%.    There was grade 2 diastolic dysfunction.  The left atrium was moderately dilated.  The   mitral valve leaflets were thickened and rheumatic.  There was moderate mitral stenosis   with a mean gradient of 7 mmHg.  There was moderate mitral regurgitation.  5. Echocardiogram on 12/28/2018: There was mild LVH.  Ejection fraction was 60 to 65%.    The left atrium was moderately enlarged.  There was a rheumatic mitral valve.  There was   moderate mitral stenosis with a mean gradient of 6.5 mmHg.  There was moderate mitral   regurgitation which was eccentric and posteriorly directed.  There was mild aortic   insufficiency.     Assessment:       Diagnosis Plan   1. Rheumatic mitral stenosis  Adult Transthoracic Echo Complete W/ Cont if Necessary Per Protocol   2. Rheumatic mitral regurgitation  Adult Transthoracic Echo Complete W/ Cont if Necessary Per Protocol   3. Frequent PVCs       Plan:       She seems to be stable at this point.  I am going to set her up for her yearly echocardiogram.  Both the mitral stenosis and mitral regurgitation were moderate on the last echocardiogram.  She has been largely asymptomatic from her PVCs.  She is still smoking, we have discussed smoking cessation again.  I strongly encouraged this.  Her blood  pressure is excellent.  She will remain on the atenolol at 25 mg twice a day.  I will see her back in the office in 6 months unless other issues arise.

## 2020-01-02 ENCOUNTER — TELEPHONE (OUTPATIENT)
Dept: CARDIOLOGY | Facility: CLINIC | Age: 65
End: 2020-01-02

## 2020-01-02 NOTE — TELEPHONE ENCOUNTER
Pt left  saying she had a question about her echo tomorrow at [facility].  I called her back and she said it's going to cost $2500 to have it done there and asked if having it done at the [facility] office would be cheaper?  I asked why she was told it would cost $2500 and she said it's her deductible.  I told her if it's her deductible then she would still owe that coming to the [facility] office.  Pt understood./prt

## 2020-01-21 ENCOUNTER — TELEPHONE (OUTPATIENT)
Dept: CARDIOLOGY | Facility: CLINIC | Age: 65
End: 2020-01-21

## 2021-05-04 PROBLEM — Z86.79 HISTORY OF MITRAL VALVE STENOSIS: Status: ACTIVE | Noted: 2017-04-20

## 2021-05-04 PROBLEM — J44.9 CHRONIC OBSTRUCTIVE PULMONARY DISEASE (HCC): Status: ACTIVE | Noted: 2021-03-19

## 2021-05-04 PROBLEM — R73.03 BORDERLINE DIABETES MELLITUS: Status: ACTIVE | Noted: 2017-04-20

## 2021-05-04 PROBLEM — Z82.49 FAMILY HISTORY OF MITRAL REGURGITATION: Status: ACTIVE | Noted: 2017-04-20

## 2021-05-04 RX ORDER — LEVOTHYROXINE SODIUM 0.2 MG/1
200 TABLET ORAL EVERY MORNING
COMMUNITY
Start: 2021-02-14 | End: 2023-01-12 | Stop reason: ALTCHOICE

## 2021-05-04 RX ORDER — NICOTINE 21 MG/24HR
PATCH, TRANSDERMAL 24 HOURS TRANSDERMAL
Status: ON HOLD | COMMUNITY
Start: 2021-04-23 | End: 2021-05-06

## 2021-05-04 RX ORDER — ONDANSETRON 4 MG/1
TABLET, FILM COATED ORAL
Status: ON HOLD | COMMUNITY
Start: 2021-02-21 | End: 2021-05-06

## 2021-05-04 RX ORDER — NICOTINE 21 MG/24HR
PATCH, TRANSDERMAL 24 HOURS TRANSDERMAL
Status: ON HOLD | COMMUNITY
Start: 2021-02-26 | End: 2021-05-06

## 2021-05-04 RX ORDER — ACETAMINOPHEN AND CODEINE PHOSPHATE 300; 30 MG/1; MG/1
1 TABLET ORAL EVERY 6 HOURS PRN
COMMUNITY
Start: 2021-02-17 | End: 2021-05-14

## 2021-05-04 RX ORDER — HYDROCODONE BITARTRATE AND ACETAMINOPHEN 5; 325 MG/1; MG/1
TABLET ORAL
COMMUNITY
Start: 2021-04-02 | End: 2021-05-14

## 2021-05-05 ENCOUNTER — HOSPITAL ENCOUNTER (INPATIENT)
Facility: HOSPITAL | Age: 66
LOS: 2 days | Discharge: HOME | End: 2021-05-07
Attending: INTERNAL MEDICINE | Admitting: INTERNAL MEDICINE

## 2021-05-05 ENCOUNTER — HOSPITAL ENCOUNTER (OUTPATIENT)
Facility: HOSPITAL | Age: 66
Setting detail: HOSPITAL OUTPATIENT SURGERY
End: 2021-05-05
Attending: INTERNAL MEDICINE | Admitting: INTERNAL MEDICINE

## 2021-05-05 DIAGNOSIS — E66.01 CLASS 3 SEVERE OBESITY DUE TO EXCESS CALORIES WITHOUT SERIOUS COMORBIDITY IN ADULT, UNSPECIFIED BMI (HCC): ICD-10-CM

## 2021-05-05 DIAGNOSIS — I05.1 RHEUMATIC MITRAL REGURGITATION: ICD-10-CM

## 2021-05-05 DIAGNOSIS — I05.1 RHEUMATIC MITRAL REGURGITATION: Primary | ICD-10-CM

## 2021-05-05 DIAGNOSIS — J43.1 PANLOBULAR EMPHYSEMA (HCC): ICD-10-CM

## 2021-05-05 DIAGNOSIS — J43.9 PULMONARY EMPHYSEMA, UNSPECIFIED EMPHYSEMA TYPE (HCC): ICD-10-CM

## 2021-05-05 LAB
ALBUMIN SERPL-MCNC: 4.4 G/DL (ref 3.5–5.2)
ALBUMIN/GLOB SERPL: 1.1 G/DL
ALP SERPL-CCNC: 101 U/L (ref 39–117)
ALT SERPL W P-5'-P-CCNC: 57 U/L (ref 1–33)
ANION GAP SERPL CALCULATED.3IONS-SCNC: 11.8 MMOL/L (ref 5–15)
AST SERPL-CCNC: 35 U/L (ref 1–32)
BILIRUB SERPL-MCNC: 0.4 MG/DL (ref 0–1.2)
BUN SERPL-MCNC: 18 MG/DL (ref 8–23)
BUN/CREAT SERPL: 22 (ref 7–25)
CALCIUM SPEC-SCNC: 9.3 MG/DL (ref 8.6–10.5)
CHLORIDE SERPL-SCNC: 98 MMOL/L (ref 98–107)
CO2 SERPL-SCNC: 28.2 MMOL/L (ref 22–29)
CREAT SERPL-MCNC: 0.82 MG/DL (ref 0.57–1)
DEPRECATED RDW RBC AUTO: 50.4 FL (ref 37–54)
ERYTHROCYTE [DISTWIDTH] IN BLOOD BY AUTOMATED COUNT: 15.7 % (ref 12.3–15.4)
GFR SERPL CREATININE-BSD FRML MDRD: 70 ML/MIN/1.73
GLOBULIN UR ELPH-MCNC: 3.9 GM/DL
GLUCOSE SERPL-MCNC: 135 MG/DL (ref 65–99)
HCT VFR BLD AUTO: 36.8 % (ref 34–46.6)
HGB BLD-MCNC: 12.7 G/DL (ref 12–15.9)
MCH RBC QN AUTO: 30.8 PG (ref 26.6–33)
MCHC RBC AUTO-ENTMCNC: 34.5 G/DL (ref 31.5–35.7)
MCV RBC AUTO: 89.3 FL (ref 79–97)
NT-PROBNP SERPL-MCNC: 479.8 PG/ML (ref 0–900)
PLATELET # BLD AUTO: 378 10*3/MM3 (ref 140–450)
PMV BLD AUTO: 9.2 FL (ref 6–12)
POTASSIUM SERPL-SCNC: 4.3 MMOL/L (ref 3.5–5.2)
PROT SERPL-MCNC: 8.3 G/DL (ref 6–8.5)
RBC # BLD AUTO: 4.12 10*6/MM3 (ref 3.77–5.28)
SODIUM SERPL-SCNC: 138 MMOL/L (ref 136–145)
WBC # BLD AUTO: 7.59 10*3/MM3 (ref 3.4–10.8)

## 2021-05-05 PROCEDURE — 83880 ASSAY OF NATRIURETIC PEPTIDE: CPT | Performed by: NURSE PRACTITIONER

## 2021-05-05 PROCEDURE — 80053 COMPREHEN METABOLIC PANEL: CPT | Performed by: NURSE PRACTITIONER

## 2021-05-05 PROCEDURE — 25010000002 FUROSEMIDE PER 20 MG: Performed by: NURSE PRACTITIONER

## 2021-05-05 PROCEDURE — 93010 ELECTROCARDIOGRAM REPORT: CPT | Performed by: INTERNAL MEDICINE

## 2021-05-05 PROCEDURE — 85027 COMPLETE CBC AUTOMATED: CPT | Performed by: NURSE PRACTITIONER

## 2021-05-05 PROCEDURE — 93005 ELECTROCARDIOGRAM TRACING: CPT | Performed by: NURSE PRACTITIONER

## 2021-05-05 RX ORDER — ACETAMINOPHEN 650 MG/1
650 SUPPOSITORY RECTAL EVERY 4 HOURS PRN
Status: DISCONTINUED | OUTPATIENT
Start: 2021-05-05 | End: 2021-05-07 | Stop reason: HOSPADM

## 2021-05-05 RX ORDER — ALUMINA, MAGNESIA, AND SIMETHICONE 2400; 2400; 240 MG/30ML; MG/30ML; MG/30ML
15 SUSPENSION ORAL EVERY 6 HOURS PRN
Status: DISCONTINUED | OUTPATIENT
Start: 2021-05-05 | End: 2021-05-07 | Stop reason: HOSPADM

## 2021-05-05 RX ORDER — SODIUM CHLORIDE 0.9 % (FLUSH) 0.9 %
10 SYRINGE (ML) INJECTION EVERY 12 HOURS SCHEDULED
Status: DISCONTINUED | OUTPATIENT
Start: 2021-05-05 | End: 2021-05-07 | Stop reason: HOSPADM

## 2021-05-05 RX ORDER — ONDANSETRON 2 MG/ML
4 INJECTION INTRAMUSCULAR; INTRAVENOUS EVERY 6 HOURS PRN
Status: DISCONTINUED | OUTPATIENT
Start: 2021-05-05 | End: 2021-05-07 | Stop reason: HOSPADM

## 2021-05-05 RX ORDER — NITROGLYCERIN 0.4 MG/1
0.4 TABLET SUBLINGUAL
Status: DISCONTINUED | OUTPATIENT
Start: 2021-05-05 | End: 2021-05-07 | Stop reason: HOSPADM

## 2021-05-05 RX ORDER — CALCIUM CARBONATE 200(500)MG
2 TABLET,CHEWABLE ORAL 2 TIMES DAILY PRN
Status: DISCONTINUED | OUTPATIENT
Start: 2021-05-05 | End: 2021-05-07 | Stop reason: HOSPADM

## 2021-05-05 RX ORDER — ACETAMINOPHEN 325 MG/1
650 TABLET ORAL EVERY 4 HOURS PRN
Status: DISCONTINUED | OUTPATIENT
Start: 2021-05-05 | End: 2021-05-07 | Stop reason: HOSPADM

## 2021-05-05 RX ORDER — DIPHENHYDRAMINE HCL 25 MG
25 CAPSULE ORAL NIGHTLY PRN
Status: DISCONTINUED | OUTPATIENT
Start: 2021-05-05 | End: 2021-05-07 | Stop reason: HOSPADM

## 2021-05-05 RX ORDER — FUROSEMIDE 10 MG/ML
40 INJECTION INTRAMUSCULAR; INTRAVENOUS ONCE
Status: COMPLETED | OUTPATIENT
Start: 2021-05-05 | End: 2021-05-05

## 2021-05-05 RX ORDER — SODIUM CHLORIDE 0.9 % (FLUSH) 0.9 %
10 SYRINGE (ML) INJECTION AS NEEDED
Status: DISCONTINUED | OUTPATIENT
Start: 2021-05-05 | End: 2021-05-07 | Stop reason: HOSPADM

## 2021-05-05 RX ORDER — ACETAMINOPHEN 160 MG/5ML
650 SOLUTION ORAL EVERY 4 HOURS PRN
Status: DISCONTINUED | OUTPATIENT
Start: 2021-05-05 | End: 2021-05-07 | Stop reason: HOSPADM

## 2021-05-05 RX ADMIN — SODIUM CHLORIDE, PRESERVATIVE FREE 10 ML: 5 INJECTION INTRAVENOUS at 21:00

## 2021-05-05 RX ADMIN — FUROSEMIDE 40 MG: 10 INJECTION, SOLUTION INTRAMUSCULAR; INTRAVENOUS at 21:05

## 2021-05-06 ENCOUNTER — APPOINTMENT (OUTPATIENT)
Dept: CARDIOLOGY | Facility: HOSPITAL | Age: 66
End: 2021-05-06

## 2021-05-06 LAB
ANION GAP SERPL CALCULATED.3IONS-SCNC: 13.3 MMOL/L (ref 5–15)
BH CV ECHO MEAS - BSA(HAYCOCK): 2 M^2
BH CV ECHO MEAS - BSA: 1.9 M^2
BH CV ECHO MEAS - BZI_BMI: 34.2 KILOGRAMS/M^2
BH CV ECHO MEAS - BZI_METRIC_HEIGHT: 157.5 CM
BH CV ECHO MEAS - BZI_METRIC_WEIGHT: 84.8 KG
BH CV ECHO MEAS - MV DEC SLOPE: 487 CM/SEC^2
BH CV ECHO MEAS - MV MAX PG: 13 MMHG
BH CV ECHO MEAS - MV MEAN PG: 6 MMHG
BH CV ECHO MEAS - MV P1/2T MAX VEL: 173 CM/SEC
BH CV ECHO MEAS - MV P1/2T: 104 MSEC
BH CV ECHO MEAS - MV V2 VTI: 51 CM
BH CV ECHO MEAS - MVA P1/2T LCG: 1.3 CM^2
BH CV ECHO MEAS - MVA(P1/2T): 2.1 CM^2
BUN SERPL-MCNC: 19 MG/DL (ref 8–23)
BUN/CREAT SERPL: 22.4 (ref 7–25)
CALCIUM SPEC-SCNC: 8.7 MG/DL (ref 8.6–10.5)
CHLORIDE SERPL-SCNC: 98 MMOL/L (ref 98–107)
CHOLEST SERPL-MCNC: 241 MG/DL (ref 0–200)
CO2 SERPL-SCNC: 27.7 MMOL/L (ref 22–29)
CREAT SERPL-MCNC: 0.85 MG/DL (ref 0.57–1)
GFR SERPL CREATININE-BSD FRML MDRD: 67 ML/MIN/1.73
GLUCOSE SERPL-MCNC: 104 MG/DL (ref 65–99)
HDLC SERPL-MCNC: 39 MG/DL (ref 40–60)
LDLC SERPL CALC-MCNC: 181 MG/DL (ref 0–100)
LDLC/HDLC SERPL: 4.58 {RATIO}
MAXIMAL PREDICTED HEART RATE: 156 BPM
POTASSIUM SERPL-SCNC: 3.8 MMOL/L (ref 3.5–5.2)
QT INTERVAL: 400 MS
SARS-COV-2 ORF1AB RESP QL NAA+PROBE: NOT DETECTED
SODIUM SERPL-SCNC: 139 MMOL/L (ref 136–145)
STRESS TARGET HR: 133 BPM
TRIGL SERPL-MCNC: 117 MG/DL (ref 0–150)
VLDLC SERPL-MCNC: 21 MG/DL (ref 5–40)

## 2021-05-06 PROCEDURE — 25010000002 MIDAZOLAM PER 1 MG: Performed by: INTERNAL MEDICINE

## 2021-05-06 PROCEDURE — C1769 GUIDE WIRE: HCPCS | Performed by: INTERNAL MEDICINE

## 2021-05-06 PROCEDURE — 93312 ECHO TRANSESOPHAGEAL: CPT

## 2021-05-06 PROCEDURE — 93460 R&L HRT ART/VENTRICLE ANGIO: CPT | Performed by: INTERNAL MEDICINE

## 2021-05-06 PROCEDURE — B2111ZZ FLUOROSCOPY OF MULTIPLE CORONARY ARTERIES USING LOW OSMOLAR CONTRAST: ICD-10-PCS | Performed by: INTERNAL MEDICINE

## 2021-05-06 PROCEDURE — 99152 MOD SED SAME PHYS/QHP 5/>YRS: CPT

## 2021-05-06 PROCEDURE — 80048 BASIC METABOLIC PNL TOTAL CA: CPT | Performed by: NURSE PRACTITIONER

## 2021-05-06 PROCEDURE — 93325 DOPPLER ECHO COLOR FLOW MAPG: CPT

## 2021-05-06 PROCEDURE — 93320 DOPPLER ECHO COMPLETE: CPT

## 2021-05-06 PROCEDURE — 94640 AIRWAY INHALATION TREATMENT: CPT

## 2021-05-06 PROCEDURE — 93312 ECHO TRANSESOPHAGEAL: CPT | Performed by: INTERNAL MEDICINE

## 2021-05-06 PROCEDURE — 80061 LIPID PANEL: CPT | Performed by: NURSE PRACTITIONER

## 2021-05-06 PROCEDURE — 25010000002 FENTANYL CITRATE (PF) 100 MCG/2ML SOLUTION: Performed by: INTERNAL MEDICINE

## 2021-05-06 PROCEDURE — 99222 1ST HOSP IP/OBS MODERATE 55: CPT | Performed by: THORACIC SURGERY (CARDIOTHORACIC VASCULAR SURGERY)

## 2021-05-06 PROCEDURE — 99153 MOD SED SAME PHYS/QHP EA: CPT

## 2021-05-06 PROCEDURE — 85018 HEMOGLOBIN: CPT

## 2021-05-06 PROCEDURE — 94799 UNLISTED PULMONARY SVC/PX: CPT

## 2021-05-06 PROCEDURE — C1894 INTRO/SHEATH, NON-LASER: HCPCS | Performed by: INTERNAL MEDICINE

## 2021-05-06 PROCEDURE — 93320 DOPPLER ECHO COMPLETE: CPT | Performed by: INTERNAL MEDICINE

## 2021-05-06 PROCEDURE — 4A023N8 MEASUREMENT OF CARDIAC SAMPLING AND PRESSURE, BILATERAL, PERCUTANEOUS APPROACH: ICD-10-PCS | Performed by: INTERNAL MEDICINE

## 2021-05-06 PROCEDURE — B2151ZZ FLUOROSCOPY OF LEFT HEART USING LOW OSMOLAR CONTRAST: ICD-10-PCS | Performed by: INTERNAL MEDICINE

## 2021-05-06 PROCEDURE — 0 IOPAMIDOL PER 1 ML: Performed by: INTERNAL MEDICINE

## 2021-05-06 PROCEDURE — U0004 COV-19 TEST NON-CDC HGH THRU: HCPCS | Performed by: INTERNAL MEDICINE

## 2021-05-06 PROCEDURE — 99223 1ST HOSP IP/OBS HIGH 75: CPT | Performed by: INTERNAL MEDICINE

## 2021-05-06 PROCEDURE — 85014 HEMATOCRIT: CPT

## 2021-05-06 PROCEDURE — U0005 INFEC AGEN DETEC AMPLI PROBE: HCPCS | Performed by: INTERNAL MEDICINE

## 2021-05-06 PROCEDURE — 25010000002 FUROSEMIDE PER 20 MG: Performed by: INTERNAL MEDICINE

## 2021-05-06 PROCEDURE — 93325 DOPPLER ECHO COLOR FLOW MAPG: CPT | Performed by: INTERNAL MEDICINE

## 2021-05-06 PROCEDURE — 25010000002 HEPARIN (PORCINE) PER 1000 UNITS: Performed by: INTERNAL MEDICINE

## 2021-05-06 RX ORDER — NICOTINE 21 MG/24HR
1 PATCH, TRANSDERMAL 24 HOURS TRANSDERMAL
Status: DISCONTINUED | OUTPATIENT
Start: 2021-05-06 | End: 2021-05-07 | Stop reason: HOSPADM

## 2021-05-06 RX ORDER — LEVOTHYROXINE SODIUM 0.1 MG/1
200 TABLET ORAL EVERY MORNING
Status: DISCONTINUED | OUTPATIENT
Start: 2021-05-07 | End: 2021-05-07 | Stop reason: HOSPADM

## 2021-05-06 RX ORDER — HYDROCODONE BITARTRATE AND ACETAMINOPHEN 5; 325 MG/1; MG/1
1 TABLET ORAL EVERY 6 HOURS PRN
Status: DISCONTINUED | OUTPATIENT
Start: 2021-05-06 | End: 2021-05-07 | Stop reason: HOSPADM

## 2021-05-06 RX ORDER — LIDOCAINE HYDROCHLORIDE 20 MG/ML
SOLUTION OROPHARYNGEAL
Status: COMPLETED | OUTPATIENT
Start: 2021-05-06 | End: 2021-05-06

## 2021-05-06 RX ORDER — FENTANYL CITRATE 50 UG/ML
INJECTION, SOLUTION INTRAMUSCULAR; INTRAVENOUS
Status: COMPLETED | OUTPATIENT
Start: 2021-05-06 | End: 2021-05-06

## 2021-05-06 RX ORDER — ATENOLOL 25 MG/1
25 TABLET ORAL EVERY 12 HOURS SCHEDULED
Status: DISCONTINUED | OUTPATIENT
Start: 2021-05-06 | End: 2021-05-07

## 2021-05-06 RX ORDER — BUDESONIDE AND FORMOTEROL FUMARATE DIHYDRATE 160; 4.5 UG/1; UG/1
2 AEROSOL RESPIRATORY (INHALATION)
Status: DISCONTINUED | OUTPATIENT
Start: 2021-05-06 | End: 2021-05-07 | Stop reason: HOSPADM

## 2021-05-06 RX ORDER — LIDOCAINE HYDROCHLORIDE 20 MG/ML
INJECTION, SOLUTION INFILTRATION; PERINEURAL AS NEEDED
Status: DISCONTINUED | OUTPATIENT
Start: 2021-05-06 | End: 2021-05-06 | Stop reason: HOSPADM

## 2021-05-06 RX ORDER — AMITRIPTYLINE HYDROCHLORIDE 50 MG/1
150 TABLET, FILM COATED ORAL NIGHTLY
Status: DISCONTINUED | OUTPATIENT
Start: 2021-05-06 | End: 2021-05-07 | Stop reason: HOSPADM

## 2021-05-06 RX ORDER — SODIUM CHLORIDE 9 MG/ML
INJECTION, SOLUTION INTRAVENOUS CONTINUOUS PRN
Status: COMPLETED | OUTPATIENT
Start: 2021-05-06 | End: 2021-05-06

## 2021-05-06 RX ORDER — LOSARTAN POTASSIUM 25 MG/1
25 TABLET ORAL
Status: DISCONTINUED | OUTPATIENT
Start: 2021-05-06 | End: 2021-05-07 | Stop reason: HOSPADM

## 2021-05-06 RX ORDER — FENTANYL CITRATE 50 UG/ML
INJECTION, SOLUTION INTRAMUSCULAR; INTRAVENOUS AS NEEDED
Status: DISCONTINUED | OUTPATIENT
Start: 2021-05-06 | End: 2021-05-06 | Stop reason: HOSPADM

## 2021-05-06 RX ORDER — FUROSEMIDE 10 MG/ML
40 INJECTION INTRAMUSCULAR; INTRAVENOUS ONCE
Status: COMPLETED | OUTPATIENT
Start: 2021-05-06 | End: 2021-05-06

## 2021-05-06 RX ORDER — FUROSEMIDE 40 MG/1
40 TABLET ORAL DAILY
Status: DISCONTINUED | OUTPATIENT
Start: 2021-05-07 | End: 2021-05-07 | Stop reason: HOSPADM

## 2021-05-06 RX ORDER — ASPIRIN 81 MG/1
81 TABLET ORAL DAILY
Status: DISCONTINUED | OUTPATIENT
Start: 2021-05-06 | End: 2021-05-07 | Stop reason: HOSPADM

## 2021-05-06 RX ORDER — MIDAZOLAM HYDROCHLORIDE 1 MG/ML
INJECTION INTRAMUSCULAR; INTRAVENOUS AS NEEDED
Status: DISCONTINUED | OUTPATIENT
Start: 2021-05-06 | End: 2021-05-06 | Stop reason: HOSPADM

## 2021-05-06 RX ORDER — MIDAZOLAM HYDROCHLORIDE 1 MG/ML
INJECTION INTRAMUSCULAR; INTRAVENOUS
Status: COMPLETED | OUTPATIENT
Start: 2021-05-06 | End: 2021-05-06

## 2021-05-06 RX ADMIN — FUROSEMIDE 40 MG: 10 INJECTION, SOLUTION INTRAMUSCULAR; INTRAVENOUS at 15:00

## 2021-05-06 RX ADMIN — SODIUM CHLORIDE, PRESERVATIVE FREE 10 ML: 5 INJECTION INTRAVENOUS at 20:42

## 2021-05-06 RX ADMIN — MIDAZOLAM 2 MG: 1 INJECTION INTRAMUSCULAR; INTRAVENOUS at 11:26

## 2021-05-06 RX ADMIN — FENTANYL CITRATE 25 MCG: 50 INJECTION INTRAMUSCULAR; INTRAVENOUS at 11:23

## 2021-05-06 RX ADMIN — AMITRIPTYLINE HYDROCHLORIDE 150 MG: 50 TABLET, FILM COATED ORAL at 20:40

## 2021-05-06 RX ADMIN — NICOTINE 1 PATCH: 14 PATCH, EXTENDED RELEASE TRANSDERMAL at 16:42

## 2021-05-06 RX ADMIN — BENZOCAINE, BUTAMBEN, AND TETRACAINE HYDROCHLORIDE 1 SPRAY: .028; .004; .004 AEROSOL, SPRAY TOPICAL at 11:09

## 2021-05-06 RX ADMIN — LOSARTAN POTASSIUM 25 MG: 25 TABLET, FILM COATED ORAL at 15:00

## 2021-05-06 RX ADMIN — SODIUM CHLORIDE, PRESERVATIVE FREE 10 ML: 5 INJECTION INTRAVENOUS at 08:05

## 2021-05-06 RX ADMIN — MIDAZOLAM 2 MG: 1 INJECTION INTRAMUSCULAR; INTRAVENOUS at 11:24

## 2021-05-06 RX ADMIN — MIDAZOLAM 2 MG: 1 INJECTION INTRAMUSCULAR; INTRAVENOUS at 11:23

## 2021-05-06 RX ADMIN — LIDOCAINE HYDROCHLORIDE 15 ML: 20 SOLUTION ORAL; TOPICAL at 11:08

## 2021-05-06 RX ADMIN — FENTANYL CITRATE 25 MCG: 50 INJECTION INTRAMUSCULAR; INTRAVENOUS at 11:24

## 2021-05-06 RX ADMIN — BUDESONIDE AND FORMOTEROL FUMARATE DIHYDRATE 2 PUFF: 160; 4.5 AEROSOL RESPIRATORY (INHALATION) at 21:25

## 2021-05-06 RX ADMIN — ATENOLOL 25 MG: 25 TABLET ORAL at 20:40

## 2021-05-06 RX ADMIN — ASPIRIN 81 MG: 81 TABLET, COATED ORAL at 16:42

## 2021-05-07 ENCOUNTER — APPOINTMENT (OUTPATIENT)
Dept: CARDIOLOGY | Facility: HOSPITAL | Age: 66
End: 2021-05-07

## 2021-05-07 VITALS
HEART RATE: 61 BPM | WEIGHT: 188.9 LBS | OXYGEN SATURATION: 94 % | DIASTOLIC BLOOD PRESSURE: 61 MMHG | RESPIRATION RATE: 16 BRPM | BODY MASS INDEX: 34.76 KG/M2 | SYSTOLIC BLOOD PRESSURE: 112 MMHG | TEMPERATURE: 98.2 F | HEIGHT: 62 IN

## 2021-05-07 LAB
ANION GAP SERPL CALCULATED.3IONS-SCNC: 11.9 MMOL/L (ref 5–15)
BUN SERPL-MCNC: 22 MG/DL (ref 8–23)
BUN/CREAT SERPL: 24.4 (ref 7–25)
CALCIUM SPEC-SCNC: 8.6 MG/DL (ref 8.6–10.5)
CHLORIDE SERPL-SCNC: 99 MMOL/L (ref 98–107)
CO2 SERPL-SCNC: 27.1 MMOL/L (ref 22–29)
CREAT SERPL-MCNC: 0.9 MG/DL (ref 0.57–1)
GFR SERPL CREATININE-BSD FRML MDRD: 63 ML/MIN/1.73
GLUCOSE SERPL-MCNC: 89 MG/DL (ref 65–99)
POTASSIUM SERPL-SCNC: 4 MMOL/L (ref 3.5–5.2)
SODIUM SERPL-SCNC: 138 MMOL/L (ref 136–145)

## 2021-05-07 PROCEDURE — 80048 BASIC METABOLIC PNL TOTAL CA: CPT | Performed by: INTERNAL MEDICINE

## 2021-05-07 PROCEDURE — 99239 HOSP IP/OBS DSCHRG MGMT >30: CPT | Performed by: INTERNAL MEDICINE

## 2021-05-07 PROCEDURE — 94799 UNLISTED PULMONARY SVC/PX: CPT

## 2021-05-07 PROCEDURE — 93246 EXT ECG>7D<15D RECORDING: CPT

## 2021-05-07 RX ORDER — FUROSEMIDE 40 MG/1
40 TABLET ORAL DAILY
Qty: 30 TABLET | Refills: 3 | Status: SHIPPED | OUTPATIENT
Start: 2021-05-08 | End: 2023-01-12 | Stop reason: ALTCHOICE

## 2021-05-07 RX ORDER — NICOTINE 21 MG/24HR
1 PATCH, TRANSDERMAL 24 HOURS TRANSDERMAL
Qty: 30 PATCH | Refills: 0 | Status: SHIPPED | OUTPATIENT
Start: 2021-05-08 | End: 2021-05-07

## 2021-05-07 RX ORDER — LOSARTAN POTASSIUM 25 MG/1
25 TABLET ORAL EVERY MORNING
Qty: 30 TABLET | Refills: 3 | Status: SHIPPED | OUTPATIENT
Start: 2021-05-07

## 2021-05-07 RX ORDER — ATENOLOL 25 MG/1
25 TABLET ORAL NIGHTLY
Status: DISCONTINUED | OUTPATIENT
Start: 2021-05-07 | End: 2021-05-07 | Stop reason: HOSPADM

## 2021-05-07 RX ORDER — LOSARTAN POTASSIUM 25 MG/1
25 TABLET ORAL EVERY MORNING
Qty: 30 TABLET | Refills: 3 | Status: SHIPPED | OUTPATIENT
Start: 2021-05-07 | End: 2021-05-07

## 2021-05-07 RX ORDER — ATENOLOL 25 MG/1
25 TABLET ORAL NIGHTLY
Qty: 30 TABLET | Refills: 1 | Status: SHIPPED | OUTPATIENT
Start: 2021-05-07 | End: 2021-05-14 | Stop reason: SDUPTHER

## 2021-05-07 RX ORDER — NICOTINE 21 MG/24HR
1 PATCH, TRANSDERMAL 24 HOURS TRANSDERMAL
Qty: 28 PATCH | Refills: 0 | Status: SHIPPED | OUTPATIENT
Start: 2021-05-08 | End: 2023-01-12 | Stop reason: ALTCHOICE

## 2021-05-07 RX ORDER — ATENOLOL 25 MG/1
25 TABLET ORAL NIGHTLY
Qty: 30 TABLET | Refills: 1 | Status: SHIPPED | OUTPATIENT
Start: 2021-05-07 | End: 2021-05-07

## 2021-05-07 RX ORDER — FUROSEMIDE 40 MG/1
40 TABLET ORAL DAILY
Qty: 30 TABLET | Refills: 3 | Status: SHIPPED | OUTPATIENT
Start: 2021-05-08 | End: 2021-05-07

## 2021-05-07 RX ADMIN — LEVOTHYROXINE SODIUM 200 MCG: 100 TABLET ORAL at 06:08

## 2021-05-07 RX ADMIN — ASPIRIN 81 MG: 81 TABLET, COATED ORAL at 08:06

## 2021-05-07 RX ADMIN — FUROSEMIDE 40 MG: 40 TABLET ORAL at 08:06

## 2021-05-07 RX ADMIN — SODIUM CHLORIDE, PRESERVATIVE FREE 10 ML: 5 INJECTION INTRAVENOUS at 08:59

## 2021-05-07 RX ADMIN — LOSARTAN POTASSIUM 25 MG: 25 TABLET, FILM COATED ORAL at 08:06

## 2021-05-07 RX ADMIN — BUDESONIDE AND FORMOTEROL FUMARATE DIHYDRATE 2 PUFF: 160; 4.5 AEROSOL RESPIRATORY (INHALATION) at 08:06

## 2021-05-07 RX ADMIN — NICOTINE 1 PATCH: 14 PATCH, EXTENDED RELEASE TRANSDERMAL at 08:07

## 2021-05-08 NOTE — CASE MANAGEMENT/SOCIAL WORK
Case Management Discharge Note      Final Note: [facility] [place] [place] [facility].  SS/CCP    Provided Post Acute Provider List?: N/A  N/A Provider List Comment: Pt declined need for list    Selected Continued Care - Discharged on 5/7/2021 Admission date: 5/5/2021 - Discharge disposition: Home or Self Care    Destination    No services have been selected for the patient.              Durable Medical Equipment Coordination complete    Service Provider Selected Services Address Phone Fax Patient Preferred    [facility]  Durable Medical Equipment [address], [place] [id number] [phone] -- --          Dialysis/Infusion    No services have been selected for the patient.              Home Medical Care    No services have been selected for the patient.              Therapy    No services have been selected for the patient.              Community Resources    No services have been selected for the patient.                       Final Discharge Disposition Code: 01 - home or self-care

## 2021-05-11 ENCOUNTER — TELEPHONE (OUTPATIENT)
Dept: CARDIOLOGY | Facility: CLINIC | Age: 66
End: 2021-05-11

## 2021-05-14 ENCOUNTER — OFFICE VISIT (OUTPATIENT)
Dept: CARDIOLOGY | Facility: CLINIC | Age: 66
End: 2021-05-14

## 2021-05-14 VITALS
HEART RATE: 84 BPM | WEIGHT: 188 LBS | OXYGEN SATURATION: 98 % | DIASTOLIC BLOOD PRESSURE: 68 MMHG | HEIGHT: 62 IN | SYSTOLIC BLOOD PRESSURE: 136 MMHG | BODY MASS INDEX: 34.6 KG/M2

## 2021-05-14 DIAGNOSIS — I50.33 ACUTE ON CHRONIC DIASTOLIC CONGESTIVE HEART FAILURE (HCC): Primary | ICD-10-CM

## 2021-05-14 DIAGNOSIS — I05.1 RHEUMATIC MITRAL REGURGITATION: ICD-10-CM

## 2021-05-14 DIAGNOSIS — I05.0 RHEUMATIC MITRAL STENOSIS: ICD-10-CM

## 2021-05-14 DIAGNOSIS — F17.210 CIGARETTE NICOTINE DEPENDENCE WITHOUT COMPLICATION: ICD-10-CM

## 2021-05-14 DIAGNOSIS — I49.3 PVC'S (PREMATURE VENTRICULAR CONTRACTIONS): ICD-10-CM

## 2021-05-14 PROCEDURE — 99214 OFFICE O/P EST MOD 30 MIN: CPT | Performed by: NURSE PRACTITIONER

## 2021-05-14 RX ORDER — ATENOLOL 25 MG/1
25 TABLET ORAL NIGHTLY
Qty: 90 TABLET | Refills: 3 | Status: SHIPPED | OUTPATIENT
Start: 2021-05-14 | End: 2021-11-17

## 2021-05-14 RX ORDER — LOSARTAN POTASSIUM 25 MG/1
25 TABLET ORAL EVERY MORNING
Qty: 90 TABLET | Refills: 3 | Status: SHIPPED | OUTPATIENT
Start: 2021-05-14

## 2021-05-14 RX ORDER — FUROSEMIDE 40 MG/1
40 TABLET ORAL DAILY
Qty: 90 TABLET | Refills: 3 | Status: SHIPPED | OUTPATIENT
Start: 2021-05-14 | End: 2023-01-12 | Stop reason: ALTCHOICE

## 2021-05-14 NOTE — PROGRESS NOTES
"    CARDIOLOGY        Patient Name: Yashira Macias  :1955  Age: 65 y.o.  Primary Cardiologist: Sorin Esqueda MD  Encounter Provider:  RA Mejía    Date of Service: 21          CHIEF COMPLAINT / REASON FOR OFFICE VISIT     Palpitations ([facility] 1 wk f/u )      HISTORY OF PRESENT ILLNESS       HPI  Yashira Macias is a 65 y.o. female who presents today for 1 week hospital reevaluation.     Pt has a  history significant for CHF, mitral valve stenosis, mitral valve insufficiency, COPD, PVCs.    Review of medical records reveals patient was transferred from [facility] for heart failure symptoms and further testing.  Patient diuresed well with furosemide.  Felt that heart failure was mostly due to underlying severe lung disease with diastolic dysfunction and possible dysrhythmia.  Patient was discharged home with home oxygen as well as a 2-week ZIO monitor.    Patient reports since discharge her breathing has improved.  She states that her weight is more stable and she is now able to exercise.  She states that for the 1st time in a long time she is able to walk without feeling short of breath.  She reports that she has stopped smoking since hospitalization and is using nicotine patches.  Reports that her fatigue is also improving.  She denies any chest pain, shortness of breath, dyspnea with exertion, palpitations, lightheadedness, lower extremity edema.    The following portions of the patient's history were reviewed and updated as appropriate: allergies, current medications, past family history, past medical history, past social history, past surgical history and problem list.      VITAL SIGNS     Visit Vitals  /68 (BP Location: Left arm, Patient Position: Sitting, Cuff Size: Adult)   Pulse 84   Ht 157.5 cm (62\")   Wt 85.3 kg (188 lb)   SpO2 98%   BMI 34.39 kg/m²         Wt Readings from Last 3 Encounters:   21 85.3 kg (188 lb)   21 85.7 kg (188 lb 14.4 oz)   19 77.8 kg " (171 lb 9.6 oz)     Body mass index is 34.39 kg/m².      REVIEW OF SYSTEMS   Review of Systems   Constitutional: Positive for malaise/fatigue. Negative for chills, fever, weight gain and weight loss.   Cardiovascular: Negative for leg swelling.   Respiratory: Negative for cough, snoring and wheezing.    Hematologic/Lymphatic: Negative for bleeding problem. Does not bruise/bleed easily.   Skin: Negative for color change.   Musculoskeletal: Negative for falls, joint pain and myalgias.   Gastrointestinal: Negative for melena.   Genitourinary: Negative for hematuria.   Neurological: Negative for excessive daytime sleepiness.   Psychiatric/Behavioral: Negative for depression. The patient is not nervous/anxious.            PHYSICAL EXAMINATION     Vitals and nursing note reviewed.   Constitutional:       Appearance: Normal appearance. Well-developed.   Eyes:      Conjunctiva/sclera: Conjunctivae normal.   Neck:      Vascular: No carotid bruit.   Pulmonary:      Breath sounds: Normal breath sounds.   Cardiovascular:      Normal rate. Regular rhythm. Normal S1 with normal intensity. Normal S2 with normal intensity.      Murmurs: There is no murmur.      No gallop. No click. No rub.   Edema:     Peripheral edema absent.   Musculoskeletal: Normal range of motion. Skin:     General: Skin is warm and dry.   Neurological:      Mental Status: Alert and oriented to person, place, and time.      GCS: GCS eye subscore is 4. GCS verbal subscore is 5. GCS motor subscore is 6.   Psychiatric:         Speech: Speech normal.         Behavior: Behavior normal.         Thought Content: Thought content normal.         Judgment: Judgment normal.           REVIEWED DATA     Procedures    Cardiac Procedures:  1. DAO 5/6/2021.  Grade 2 plaque in the descending aorta.  LAE.  Right atrial cavity is moderate to severely dilated.  Saline test results are negative.  LVEF 61 to 65%.  Aortic valve leaflets appear trileaflet.  Mobile mass noted on the  NCC, LV surface.  Mobile mass noted on the aortic surface of the LCC likely fibroblastoma.  Mild mitral valve regurgitation.  Mild mitral valve stenosis with rheumatic changes of the mitral valve apparatus.  Calculated mitral valve Ortega score is 4.  Posterior leaflet is thickened and retracted.  Anterior leaflet is thickened with hockey sticking appearance consistent with prior rheumatic valvulitis.  2. Cardiac catheterization 5/6/2021.  Left main is medium caliber and normal.  LAD medium caliber and normal.  Ramus is absent.  Circumflex is nondominant, small caliber, normal.  RCA is medium caliber, dominant.  Normal RPDA is medium normal.    Lipid Panel    Lipid Panel 5/6/21   Total Cholesterol 241 (A)   Triglycerides 117   HDL Cholesterol 39 (A)   VLDL Cholesterol 21   LDL Cholesterol  181 (A)   LDL/HDL Ratio 4.58   (A) Abnormal value                ASSESSMENT & PLAN      Diagnosis Plan   1. Acute on chronic diastolic congestive heart failure (CMS/HCC)  Basic Metabolic Panel   2. Rheumatic mitral stenosis     3. Rheumatic mitral regurgitation     4. PVC's (premature ventricular contractions)     5. Cigarette nicotine dependence without complication           SUMMARY/DISCUSSION  1. Acute on chronic diastolic heart failure.  Patient with recent hospitalization for diastolic heart failure and volume overload.  She is now on diuretics as well as losartan and reports improvement of symptoms.  Patient states that she is no longer experiencing shortness of breath at rest or with exertion.  She states that for the 1st time in a long time she was able to walk without feeling short of breath.  She will continue furosemide 40 mg/day and losartan 25 mg/day.  She will get a outpatient BMP at [facility] to monitor renal function with the addition of these medications during hospitalization.  2. Rheumatic mitral valve stenosis and mitral valve regurgitation.  Mild on recent DAO imaging.  Patient also had cardiac catheterization.  No  surgical intervention indicated at this time.  3. Frequent PVCs.  Controlled with atenolol 25 mg/day.  4. Recent tobacco abuse.  Patient has successfully stopped smoking with the use of nicotine patches.  Continued cessation encouraged.  5. Follow-up with Dr. Esqueda in 2 months at [facility] for hospital follow-up.        MEDICATIONS         Discharge Medications          Accurate as of May 14, 2021  3:11 PM. If you have any questions, ask your nurse or doctor.            Continue These Medications      Instructions Start Date   Advair Diskus 250-50 MCG/DOSE DISKUS  Generic drug: fluticasone-salmeterol   1 Units      amitriptyline 150 MG tablet  Commonly known as: ELAVIL   TAKE 1 TABLET BY MOUTH AT BEDTIME      aspirin 81 MG EC tablet   81 mg, Oral, Daily      atenolol 25 MG tablet  Commonly known as: TENORMIN   25 mg, Oral, Nightly      furosemide 40 MG tablet  Commonly known as: LASIX   40 mg, Oral, Daily      levothyroxine 200 MCG tablet  Commonly known as: SYNTHROID, LEVOTHROID   200 mcg, Oral, Every Morning      losartan 25 MG tablet  Commonly known as: COZAAR   25 mg, Oral, Every Morning      Nicotine Step 2 14 MG/24HR patch  Generic drug: nicotine   1 patch, Transdermal, Every 24 Hours Scheduled         Stop These Medications    acetaminophen-codeine 300-30 MG per tablet  Commonly known as: TYLENOL #3  Stopped by: RA Mejía     HYDROcodone-acetaminophen 5-325 MG per tablet  Commonly known as: NORCO  Stopped by: RA Mejía                **Dragon Disclaimer:   Much of this encounter note is an electronic transcription/translation of spoken language to printed text. The electronic translation of spoken language may permit erroneous, or at times, nonsensical words or phrases to be inadvertently transcribed. Although I have reviewed the note for such errors, some may still exist.

## 2021-06-12 LAB
MAXIMAL PREDICTED HEART RATE: 156 BPM
STRESS TARGET HR: 133 BPM

## 2021-06-12 PROCEDURE — 93248 EXT ECG>7D<15D REV&INTERPJ: CPT | Performed by: INTERNAL MEDICINE

## 2021-06-14 ENCOUNTER — TELEPHONE (OUTPATIENT)
Dept: CARDIOLOGY | Facility: CLINIC | Age: 66
End: 2021-06-14

## 2021-07-26 LAB
HCT VFR BLDA CALC: 37 % (ref 38–51)
HCT VFR BLDA CALC: 38 % (ref 38–51)
HGB BLDA-MCNC: 12.6 G/DL (ref 12–17)
HGB BLDA-MCNC: 12.9 G/DL (ref 12–17)
SAO2 % BLDA: 61 % (ref 95–98)
SAO2 % BLDA: 94 % (ref 95–98)

## 2021-08-11 ENCOUNTER — OFFICE VISIT (OUTPATIENT)
Dept: CARDIOLOGY | Facility: CLINIC | Age: 66
End: 2021-08-11

## 2021-08-11 VITALS
HEART RATE: 68 BPM | DIASTOLIC BLOOD PRESSURE: 54 MMHG | BODY MASS INDEX: 36.99 KG/M2 | HEIGHT: 62 IN | SYSTOLIC BLOOD PRESSURE: 128 MMHG | WEIGHT: 201 LBS

## 2021-08-11 DIAGNOSIS — I50.32 CHRONIC DIASTOLIC CONGESTIVE HEART FAILURE (HCC): ICD-10-CM

## 2021-08-11 DIAGNOSIS — I49.3 FREQUENT PVCS: ICD-10-CM

## 2021-08-11 DIAGNOSIS — I05.1 RHEUMATIC MITRAL REGURGITATION: Primary | ICD-10-CM

## 2021-08-11 DIAGNOSIS — I05.0 RHEUMATIC MITRAL STENOSIS: ICD-10-CM

## 2021-08-11 PROCEDURE — 99214 OFFICE O/P EST MOD 30 MIN: CPT | Performed by: INTERNAL MEDICINE

## 2021-08-11 RX ORDER — LEVOTHYROXINE SODIUM 112 UG/1
224 TABLET ORAL EVERY MORNING
COMMUNITY
Start: 2021-05-06 | End: 2023-01-12 | Stop reason: ALTCHOICE

## 2021-08-29 NOTE — PROGRESS NOTES
"Date of Office Visit: 2021  Encounter Provider: Georges Esqueda MD  Place of Service: [facility] CARDIOLOGY  Patient Name: Yashira Macias  :1955    Chief complaint: Follow-up for rheumatic mitral regurgitation and mitral stenosis,   frequent PVCs, and chronic diastolic CHF.    History of Present Illness:    I again had the pleasure of seeing your patient in cardiology office on 2021.  As you   well know, she is a very pleasant 66 year-old white female with a past medical history   significant for mitral valve stenosis, mitral regurgitation, and frequent PVCs who presents   for follow-up. The patient initially saw me on 2016. She had been experiencing   palpitations which were mild in nature and she described these as if her heart were   \"skipping beats.\" She had not had any chest discomfort or shortness of breath. She   underwent an exercise treadmill stress test and 2D echocardiogram on 2016. Her   stress test showed horizontal ST depression suggestive of ischemia. Her   echocardiogram showed possible mitral stenosis with a peak gradient of 25 mmHg and a   mean gradient of 11 mmHg. She also had a Holter monitor performed on 2016   which showed very frequent PVCs with a total of 12,909 isolated events. She also had 2   very brief runs of SVT, 8 and 10 beats respectively.      She underwent a transesophageal echocardiogram to further evaluate the mitral valve   on 2016. This showed an ejection fraction of 60% to 65% with grade 2 diastolic   dysfunction. However, the posterior mitral valve leaflet was tethered and fixed, resulting   in malcoaptation of the mitral valve leaflets. She had moderate to severe mitral   regurgitation, and significant mitral stenosis with a mean gradient of 9.5 mmHg. The   degree of mitral stenosis was felt to be overestimated secondary to the mitral  regurgitation. She also had a likely fibroelastoma on the " noncoronary cusp of the aortic  valve, as well as a calculated right ventricular systolic pressure of 65 mmHg.     The patient was admitted on 5/5/2021 with worsening dyspnea, and pulmonary edema.    It was determined that this was acute on chronic diastolic CHF.  She was transferred to   [facility], and underwent a DAO and right and left heart catheterization on 5/6/2021.  The   DAO only showed mild mitral stenosis and mild mitral regurgitation.  It was felt that this   may have been slightly underestimated, although not severe enough for surgery.  The   left heart catheterization showed angiographically normal coronary arteries.  The right   heart catheterization did show significant pulmonary hypertension with a mean PA   pressure of 40 mmHg, although her pulmonary capillary wedge pressure was also   elevated at 25.  She was diuresed and was eventually discharged with medical therapy.     The patient presents today for follow-up.  She actually quit smoking in March, and has   remained tobacco free.  She has had no chest pain, and her shortness of breath has   been at baseline.  She has not had any recent palpitations from the PVCs.    Past Medical History:   Diagnosis Date   • Frequent PVCs    • Hypothyroidism    • Mitral regurgitation    • Mitral stenosis    • Papillary fibroelastoma of heart     Small fibroelastoma attached to left coronary cusp of arotic valve by DAO 5/4/16       Past Surgical History:   Procedure Laterality Date   • CARDIAC CATHETERIZATION N/A 5/6/2021    Procedure: Right and Left Heart Cath;  Surgeon: Shannan Casey MD;  Location: [facility] CATH INVASIVE LOCATION;  Service: Cardiovascular;  Laterality: N/A;   • CARPAL TUNNEL RELEASE     • HYSTERECTOMY         Current Outpatient Medications on File Prior to Visit   Medication Sig Dispense Refill   • amitriptyline (ELAVIL) 150 MG tablet TAKE 1 TABLET BY MOUTH AT BEDTIME  3   • aspirin 81 MG EC tablet Take 81 mg by mouth Daily.     • atenolol  "(TENORMIN) 25 MG tablet Take 1 tablet by mouth Every Night. 90 tablet 3   • fluticasone-salmeterol (Advair Diskus) 250-50 MCG/DOSE DISKUS 1 Units.     • furosemide (LASIX) 40 MG tablet Take 1 tablet by mouth Daily. 30 tablet 3   • furosemide (LASIX) 40 MG tablet Take 1 tablet by mouth Daily. 90 tablet 3   • levothyroxine (SYNTHROID, LEVOTHROID) 112 MCG tablet Take 224 mcg by mouth Every Morning.     • levothyroxine (SYNTHROID, LEVOTHROID) 200 MCG tablet Take 200 mcg by mouth Every Morning.     • losartan (COZAAR) 25 MG tablet Take 1 tablet by mouth Every Morning. 30 tablet 3   • losartan (COZAAR) 25 MG tablet Take 1 tablet by mouth Every Morning. 90 tablet 3   • nicotine (NICODERM CQ) 14 MG/24HR patch Place 1 patch on the skin as directed by provider Daily. 28 patch 0     No current facility-administered medications on file prior to visit.     Allergies as of 2021 - Reviewed 2021   Allergen Reaction Noted   • Sulfa antibiotics Hives 2016     Social History     Socioeconomic History   • Marital status:      Spouse name: Not on file   • Number of children: Not on file   • Years of education: Not on file   • Highest education level: Not on file   Tobacco Use   • Smoking status: Former Smoker     Packs/day: 1.00     Quit date:      Years since quittin.6   • Smokeless tobacco: Never Used   • Tobacco comment: Smoked for over 40 years    Substance and Sexual Activity   • Alcohol use: No     Comment: 1 can caffeine daily   • Drug use: No     Family History   Problem Relation Age of Onset   • Diabetes Brother        Review of Systems   Cardiovascular: Positive for dyspnea on exertion and palpitations.   All other systems reviewed and are negative.     Objective:     Vitals:    21 1328   BP: 128/54   Pulse: 68   Weight: 91.2 kg (201 lb)   Height: 157.5 cm (62\")     Body mass index is 36.76 kg/m².    Physical Exam  Constitutional:       Appearance: She is well-developed.   HENT:      " Head: Normocephalic and atraumatic.   Eyes:      Conjunctiva/sclera: Conjunctivae normal.   Cardiovascular:      Rate and Rhythm: Normal rate and regular rhythm.      Heart sounds: Murmur heard.   Systolic murmur is present with a grade of 2/6 at the lower left sternal border and apex.   No friction rub. No gallop.    Pulmonary:      Effort: Pulmonary effort is normal.      Breath sounds: Normal breath sounds.   Abdominal:      Palpations: Abdomen is soft.      Tenderness: There is no abdominal tenderness.   Musculoskeletal:      Cervical back: Neck supple.   Skin:     General: Skin is warm.   Neurological:      Mental Status: She is alert and oriented to person, place, and time.   Psychiatric:         Behavior: Behavior normal.       Lab Review:   Procedures    Cardiac Procedures:  1. 24-hour Holter monitor on 4/19/2016 showed very frequent premature ventricular   contractions with a total of 12,909 events. She did have 2 couplets and 1250 bigeminy   runs. She also had 2 short runs of SVT at 8 and 10 beats respectively.   2. Transesophageal echocardiogram on 5/4/2016 showed an ejection fraction of 60%.   There was grade 2 diastolic dysfunction. The left atrium was moderately dilated. There   was no evidence of shunting. The right ventricle was normal in size and function. The   posterior mitral valve leaflet was tethered and fixed, resulting in malcoaptation of the mitral   valve leaflets. There was moderate to severe mitral regurgitation. There was up to   moderate to severe mitral stenosis with a mean gradient of 9.5 mmHg (however, this was   possibly overestimated secondary to the mitral regurgitation). There was a small   fibroelastoma on the left coronary cusp of the aortic valve. There was mild tricuspid   regurgitation with a calculated right ventricular systolic pressure of 65 mmHg.    3. Echocardiogram on 12/21/2016 revealed mild LVH.  The ejection fraction was 60-65%.    There was grade II diastolic  dysfunction.  The left atrium was moderately dilated.  The   mitral valve leaflets were thickened and possibly rheumatic.  There was doming of the   anterior mitral valve leaflet.  There was moderate mitral regurgitation which was likely   underestimated.    4. Echocardiogram on 12/20/2017: There is mild LVH.  The ejection fraction is 60-65%.    There was grade 2 diastolic dysfunction.  The left atrium was moderately dilated.  The   mitral valve leaflets were thickened and rheumatic.  There was moderate mitral stenosis   with a mean gradient of 7 mmHg.  There was moderate mitral regurgitation.  5. Echocardiogram on 12/28/2018: There was mild LVH.  Ejection fraction was 60 to 65%.    The left atrium was moderately enlarged.  There was a rheumatic mitral valve.  There was   moderate mitral stenosis with a mean gradient of 6.5 mmHg.  There was moderate mitral   regurgitation which was eccentric and posteriorly directed.  There was mild aortic   insufficiency.  6.  DAO on 5/6/2021: The ejection fraction was 60 to 65%.  There was mild mitral stenosis   and mitral regurgitation.  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  The left atrium   was severely enlarged.  The right atrium was moderately to severely enlarged.  The saline   study was negative.  There was grade 2 plaque in the descending aorta.  7.  Left and right heart catheterization on 5/6/2021: The mean PA pressure was 40.  The   pulmonary capillary wedge pressure was 25.  The coronary arteries were angiographically   normal.    Assessment:       Diagnosis Plan   1. Rheumatic mitral regurgitation     2. Rheumatic mitral stenosis     3. Frequent PVCs     4. Chronic diastolic congestive heart failure (CMS/HCC)       Plan:       Again, the patient has quit smoking as of March 2021, and has remained tobacco free since that time.  I commended her for this today as she had a significantly difficult time quitting in the past.  I encouraged her to remain tobacco  free.  The pulmonary hypertension is undoubtedly a combination of her COPD and diastolic congestive heart failure.  She is currently euvolemic and has baseline shortness of breath.  She will continue on the Lasix at 40 mg/day.  She will also continue on the atenolol and losartan.  Her blood pressure is excellent at 128/54.  She will still need periodic assessment of her mitral valve.  However, the DAO several months ago showed only mild mitral stenosis and mitral regurgitation.  I honestly felt this was likely underestimated, although was still not in surgical territory.  I will have her return to the office in 6 months unless other issues arise.    I spent 30 minutes in the care of the patient, including extensively reviewing her recent hospitalization several months ago.  I also discussed her mitral valve status and future treatment in detail with her in the office today.

## 2021-11-17 ENCOUNTER — OFFICE VISIT (OUTPATIENT)
Dept: CARDIOLOGY | Facility: CLINIC | Age: 66
End: 2021-11-17

## 2021-11-17 VITALS
WEIGHT: 197 LBS | HEIGHT: 62 IN | HEART RATE: 60 BPM | DIASTOLIC BLOOD PRESSURE: 70 MMHG | SYSTOLIC BLOOD PRESSURE: 110 MMHG | BODY MASS INDEX: 36.25 KG/M2

## 2021-11-17 DIAGNOSIS — I05.1 RHEUMATIC MITRAL REGURGITATION: ICD-10-CM

## 2021-11-17 DIAGNOSIS — I50.32 CHRONIC DIASTOLIC CONGESTIVE HEART FAILURE (HCC): ICD-10-CM

## 2021-11-17 DIAGNOSIS — I05.0 RHEUMATIC MITRAL STENOSIS: Primary | ICD-10-CM

## 2021-11-17 DIAGNOSIS — I48.91 ATRIAL FIBRILLATION, UNSPECIFIED TYPE (HCC): ICD-10-CM

## 2021-11-17 DIAGNOSIS — I49.3 FREQUENT PVCS: ICD-10-CM

## 2021-11-17 PROCEDURE — 99214 OFFICE O/P EST MOD 30 MIN: CPT | Performed by: INTERNAL MEDICINE

## 2021-11-17 RX ORDER — ATENOLOL 25 MG/1
25 TABLET ORAL 2 TIMES DAILY
Qty: 180 TABLET | Refills: 3 | Status: SHIPPED | OUTPATIENT
Start: 2021-11-17

## 2021-11-17 RX ORDER — POTASSIUM CHLORIDE 20 MEQ/1
20 TABLET, EXTENDED RELEASE ORAL 2 TIMES DAILY
COMMUNITY

## 2021-11-17 RX ORDER — DICLOFENAC SODIUM 75 MG/1
75 TABLET, DELAYED RELEASE ORAL 2 TIMES DAILY
COMMUNITY
End: 2023-01-12 | Stop reason: ALTCHOICE

## 2021-11-17 NOTE — PROGRESS NOTES
"Date of Office Visit: 2021  Encounter Provider: Georges Esqueda MD  Place of Service: [facility] CARDIOLOGY  Patient Name: Yashira Macias  :1955    Chief complaint: Follow-up for rheumatic mitral regurgitation and mitral stenosis,   frequent PVCs, and chronic diastolic CHF.  Newly diagnosed paroxysmal atrial   fibrillation.    History of Present Illness:    I again had the pleasure of seeing your patient in cardiology office on 2021.  As you   well know, she is a very pleasant 66 year-old white female with a past medical history   significant for mitral valve stenosis, mitral regurgitation, and frequent PVCs who presents   for follow-up. The patient initially saw me on 2016. She had been experiencing   palpitations which were mild in nature and she described these as if her heart were   \"skipping beats.\" She had not had any chest discomfort or shortness of breath. She   underwent an exercise treadmill stress test and 2D echocardiogram on 2016. Her   stress test showed horizontal ST depression suggestive of ischemia. Her   echocardiogram showed possible mitral stenosis with a peak gradient of 25 mmHg and a   mean gradient of 11 mmHg. She also had a Holter monitor performed on 2016   which showed very frequent PVCs with a total of 12,909 isolated events. She also had 2   very brief runs of SVT, 8 and 10 beats respectively.      She underwent a transesophageal echocardiogram to further evaluate the mitral valve   on 2016. This showed an ejection fraction of 60% to 65% with grade 2 diastolic   dysfunction. However, the posterior mitral valve leaflet was tethered and fixed, resulting   in malcoaptation of the mitral valve leaflets. She had moderate to severe mitral   regurgitation, and significant mitral stenosis with a mean gradient of 9.5 mmHg. The   degree of mitral stenosis was felt to be overestimated secondary to the mitral  regurgitation. " "She also had a likely fibroelastoma on the noncoronary cusp of the aortic  valve, as well as a calculated right ventricular systolic pressure of 65 mmHg.     The patient was admitted on 5/5/2021 with worsening dyspnea, and pulmonary edema.    It was determined that this was acute on chronic diastolic CHF.  She was transferred to   [facility], and underwent a DAO and right and left heart catheterization on 5/6/2021.  The   DAO only showed mild mitral stenosis and mild mitral regurgitation.  It was felt that this   may have been slightly underestimated, although not severe enough for surgery.  The   left heart catheterization showed angiographically normal coronary arteries.  The right   heart catheterization did show significant pulmonary hypertension with a mean PA   pressure of 40 mmHg, although her pulmonary capillary wedge pressure was also   elevated at 25.  She was diuresed and was eventually discharged with medical therapy.    The patient began to have episodes of palpitations and heart pounding, as well as   feelings of \"emptiness in her stomach\" around October 2021.  She was ultimately   diagnosed with new paroxysmal atrial fibrillation.  She did have episodes where her   heart rate would go fast.  Dr. Bledsoe started Eliquis, and changed her atenolol to 12.5   mg twice a day.  She also was having trouble with congestive heart failure, mainly from   abdominal edema and shortness of breath.  Her Lasix was increased to 80 mg/day,   and she actually started taking it twice a day.  She diuresed well, and felt much better.     The patient presents today for follow-up.  Overall, she is doing better.  She still feels   better, and has only rarely felt the atrial fibrillation within the last several weeks.  She is   in sinus rhythm today.  She has lost 9 pounds of fluid since starting the Lasix 80 mg bid.    She is not having any shortness of breath at this point.  She has had no chest pain.        Past Medical History: "   Diagnosis Date   • Frequent PVCs    • Hypothyroidism    • Mitral regurgitation    • Mitral stenosis    • Papillary fibroelastoma of heart     Small fibroelastoma attached to left coronary cusp of arotic valve by DAO 5/4/16       Past Surgical History:   Procedure Laterality Date   • CARDIAC CATHETERIZATION N/A 5/6/2021    Procedure: Right and Left Heart Cath;  Surgeon: Shannan Casey MD;  Location: [facility] CATH INVASIVE LOCATION;  Service: Cardiovascular;  Laterality: N/A;   • CARPAL TUNNEL RELEASE     • HYSTERECTOMY         Current Outpatient Medications on File Prior to Visit   Medication Sig Dispense Refill   • apixaban (ELIQUIS) 5 MG tablet tablet Take 5 mg by mouth 2 (Two) Times a Day.     • diclofenac (VOLTAREN) 75 MG EC tablet Take 75 mg by mouth 2 (Two) Times a Day.     • furosemide (LASIX) 40 MG tablet Take 1 tablet by mouth Daily. (Patient taking differently: Take 80 mg by mouth Daily. Take one in the morning and every 6 hours as needed) 30 tablet 3   • potassium chloride (K-DUR,KLOR-CON) 20 MEQ CR tablet Take 20 mEq by mouth 2 (Two) Times a Day.     • amitriptyline (ELAVIL) 150 MG tablet TAKE 1 TABLET BY MOUTH AT BEDTIME  3   • aspirin 81 MG EC tablet Take 81 mg by mouth Daily.     • fluticasone-salmeterol (Advair Diskus) 250-50 MCG/DOSE DISKUS 1 Units.     • furosemide (LASIX) 40 MG tablet Take 1 tablet by mouth Daily. 90 tablet 3   • levothyroxine (SYNTHROID, LEVOTHROID) 112 MCG tablet Take 224 mcg by mouth Every Morning.     • levothyroxine (SYNTHROID, LEVOTHROID) 200 MCG tablet Take 200 mcg by mouth Every Morning.     • losartan (COZAAR) 25 MG tablet Take 1 tablet by mouth Every Morning. 30 tablet 3   • losartan (COZAAR) 25 MG tablet Take 1 tablet by mouth Every Morning. 90 tablet 3   • nicotine (NICODERM CQ) 14 MG/24HR patch Place 1 patch on the skin as directed by provider Daily. 28 patch 0   • [DISCONTINUED] atenolol (TENORMIN) 25 MG tablet Take 1 tablet by mouth Every Night. 90 tablet 3     No  "current facility-administered medications on file prior to visit.     Allergies as of 2021 - Reviewed 2021   Allergen Reaction Noted   • Sulfa antibiotics Hives 2016     Social History     Socioeconomic History   • Marital status:    Tobacco Use   • Smoking status: Former Smoker     Packs/day: 1.00     Quit date:      Years since quittin.8   • Smokeless tobacco: Never Used   • Tobacco comment: Smoked for over 40 years    Substance and Sexual Activity   • Alcohol use: No     Comment: 1 can caffeine daily   • Drug use: No     Family History   Problem Relation Age of Onset   • Diabetes Brother        Review of Systems   Cardiovascular: Positive for dyspnea on exertion and palpitations.   All other systems reviewed and are negative.     Objective:     Vitals:    21 1006 21 1016   BP:  110/70   Pulse:  60   Weight:  89.4 kg (197 lb)   Height: 157.5 cm (62\") 157.5 cm (62\")     Body mass index is 36.03 kg/m².    Physical Exam  Constitutional:       Appearance: She is well-developed.   HENT:      Head: Normocephalic and atraumatic.   Eyes:      Conjunctiva/sclera: Conjunctivae normal.   Cardiovascular:      Rate and Rhythm: Normal rate and regular rhythm.      Heart sounds: Murmur heard.    Systolic murmur is present with a grade of 2/6 at the lower left sternal border and apex.  No friction rub. No gallop.    Pulmonary:      Effort: Pulmonary effort is normal.      Breath sounds: Normal breath sounds.   Abdominal:      Palpations: Abdomen is soft.      Tenderness: There is no abdominal tenderness.   Musculoskeletal:      Cervical back: Neck supple.   Skin:     General: Skin is warm.   Neurological:      Mental Status: She is alert and oriented to person, place, and time.   Psychiatric:         Behavior: Behavior normal.       Lab Review:   Procedures    Cardiac Procedures:  1. 24-hour Holter monitor on 2016 showed very frequent premature ventricular   contractions with a " total of 12,909 events. She did have 2 couplets and 1250 bigeminy   runs. She also had 2 short runs of SVT at 8 and 10 beats respectively.   2. Transesophageal echocardiogram on 5/4/2016 showed an ejection fraction of 60%.   There was grade 2 diastolic dysfunction. The left atrium was moderately dilated. There   was no evidence of shunting. The right ventricle was normal in size and function. The   posterior mitral valve leaflet was tethered and fixed, resulting in malcoaptation of the mitral   valve leaflets. There was moderate to severe mitral regurgitation. There was up to   moderate to severe mitral stenosis with a mean gradient of 9.5 mmHg (however, this was   possibly overestimated secondary to the mitral regurgitation). There was a small   fibroelastoma on the left coronary cusp of the aortic valve. There was mild tricuspid   regurgitation with a calculated right ventricular systolic pressure of 65 mmHg.    3. Echocardiogram on 12/21/2016 revealed mild LVH.  The ejection fraction was 60-65%.    There was grade II diastolic dysfunction.  The left atrium was moderately dilated.  The   mitral valve leaflets were thickened and possibly rheumatic.  There was doming of the   anterior mitral valve leaflet.  There was moderate mitral regurgitation which was likely   underestimated.    4. Echocardiogram on 12/20/2017: There is mild LVH.  The ejection fraction is 60-65%.    There was grade 2 diastolic dysfunction.  The left atrium was moderately dilated.  The   mitral valve leaflets were thickened and rheumatic.  There was moderate mitral stenosis   with a mean gradient of 7 mmHg.  There was moderate mitral regurgitation.  5. Echocardiogram on 12/28/2018: There was mild LVH.  Ejection fraction was 60 to 65%.    The left atrium was moderately enlarged.  There was a rheumatic mitral valve.  There was   moderate mitral stenosis with a mean gradient of 6.5 mmHg.  There was moderate mitral   regurgitation which was  eccentric and posteriorly directed.  There was mild aortic   insufficiency.  6.  DAO on 5/6/2021: The ejection fraction was 60 to 65%.  There was mild mitral stenosis   and mitral regurgitation.  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  The left atrium   was severely enlarged.  The right atrium was moderately to severely enlarged.  The saline   study was negative.  There was grade 2 plaque in the descending aorta.  7.  Left and right heart catheterization on 5/6/2021: The mean PA pressure was 40.  The   pulmonary capillary wedge pressure was 25.  The coronary arteries were angiographically   normal.    Assessment:       Diagnosis Plan   1. Rheumatic mitral stenosis  Adult Transthoracic Echo Complete w/ Color, Spectral and Contrast if Necessary Per Protocol   2. Rheumatic mitral regurgitation  Adult Transthoracic Echo Complete w/ Color, Spectral and Contrast if Necessary Per Protocol   3. Atrial fibrillation, unspecified type (HCC)  Adult Transthoracic Echo Complete w/ Color, Spectral and Contrast if Necessary Per Protocol   4. Frequent PVCs     5. Chronic diastolic congestive heart failure (HCC)  Adult Transthoracic Echo Complete w/ Color, Spectral and Contrast if Necessary Per Protocol     Plan:       Again, she was diagnosed with paroxysmal atrial fibrillation recently.  She is symptomatic when she has this.  Her atenolol was changed to 12.5 mg twice a day.  I told her that if she has symptomatic atrial fibrillation, she can actually take an extra 25 mg tablet of atenolol.  She is taking the Eliquis 5 mg twice a day without difficulty currently.  When the atrial fibrillation was diagnosed, she had worsening diastolic congestive heart failure, which makes sense.  She has lost 9 pounds of fluid since increasing the Lasix to 80 mg twice a day.  I am going to keep her on this dose.  I am going to recheck an echocardiogram at this point to reevaluate for mitral valve disease.  The DAO from May 2021 showed less  mitral stenosis and mitral regurgitation than the transthoracic studies.  I will determine follow-up after the transthoracic echocardiogram.

## 2021-11-19 ENCOUNTER — TELEPHONE (OUTPATIENT)
Dept: CARDIOLOGY | Facility: CLINIC | Age: 66
End: 2021-11-19

## 2021-11-19 NOTE — TELEPHONE ENCOUNTER
Dr. Bledsoe' nurse Karen called and LVM stating Dr. Bledsoe wanted your opinion on whether or not the pt would be a good candidate for coumadin. Pt has informed them that she cannot afford $100 per month for her Eliquis. Please advise. // HG

## 2021-11-23 NOTE — TELEPHONE ENCOUNTER
I called Karen back and the office is closed on Tuesdays.  The voicemail is full so I can't leave a message.  Can you please try to call on Wednesday.  Thanks!  Alecia

## 2021-11-24 NOTE — TELEPHONE ENCOUNTER
Called and spoke with Karen to inform, she verbalized understanding. She is going to talk to Dr. Bledsoe to see what the next steps are and she will call back to let us know. // HG

## 2023-01-12 ENCOUNTER — OFFICE VISIT (OUTPATIENT)
Dept: CARDIOLOGY | Facility: CLINIC | Age: 68
End: 2023-01-12
Payer: MEDICARE

## 2023-01-12 VITALS
HEIGHT: 62 IN | SYSTOLIC BLOOD PRESSURE: 104 MMHG | DIASTOLIC BLOOD PRESSURE: 52 MMHG | HEART RATE: 65 BPM | BODY MASS INDEX: 33.4 KG/M2 | WEIGHT: 181.5 LBS

## 2023-01-12 DIAGNOSIS — J44.9 CHRONIC OBSTRUCTIVE PULMONARY DISEASE, UNSPECIFIED COPD TYPE: ICD-10-CM

## 2023-01-12 DIAGNOSIS — I05.1 RHEUMATIC MITRAL REGURGITATION: ICD-10-CM

## 2023-01-12 DIAGNOSIS — Z86.79 HISTORY OF MITRAL VALVE STENOSIS: Primary | ICD-10-CM

## 2023-01-12 PROCEDURE — 99214 OFFICE O/P EST MOD 30 MIN: CPT | Performed by: INTERNAL MEDICINE

## 2023-01-12 PROCEDURE — 93000 ELECTROCARDIOGRAM COMPLETE: CPT | Performed by: INTERNAL MEDICINE

## 2023-01-12 RX ORDER — AMITRIPTYLINE HYDROCHLORIDE 100 MG/1
100 TABLET, FILM COATED ORAL EVERY EVENING
COMMUNITY
Start: 2022-12-16

## 2023-01-12 RX ORDER — FUROSEMIDE 80 MG
TABLET ORAL
COMMUNITY
Start: 2022-10-25

## 2023-01-12 RX ORDER — LEVOTHYROXINE SODIUM 0.12 MG/1
250 TABLET ORAL EVERY MORNING
COMMUNITY
Start: 2022-11-21

## 2023-01-12 NOTE — PROGRESS NOTES
"    Subjective:     Encounter Date:01/12/23      Patient ID: Yashira Macias is a 67 y.o. female.    Chief Complaint:  History of Present Illness      Dear Dr. Bledsoe,    I had the pleasure of seeing this patient in the office today for routine follow-up of her cardiac status.  She has a history of mitral stenosis, mitral regurgitation, and paroxysmal atrial fibrillation.  Is been 1 year since her last visit.    She used to follow with Dr. Esqueda.    She has been doing great without any complaints at all.  She denies any chest pain, pressure, tightness, squeezing, or heartburn.  She has not experienced any feeling of palpitations, tachycardia or heart racing and no presyncope or syncope.  There have not been any problems with dizziness or lightheadedness.  There have not been any orthopnea or PND, and no problems with lower extremity edema.  She denies any shortness of breath at rest or with activity and has not had any wheezing.  She has not had any problems with unexplained nausea or vomiting. She has continued to perform daily activities of living without any specific problem or change in the level of activity.  She has not been recently hospitalized for any reason.    Prior history has been summarized as follows:  She has a past medical history   significant for mitral valve stenosis, mitral regurgitation, and frequent PVCs who presents   for follow-up. The patient initially saw me on 4/20/2016. She had been experiencing   palpitations which were mild in nature and she described these as if her heart were   \"skipping beats.\" She had not had any chest discomfort or shortness of breath. She   underwent an exercise treadmill stress test and 2D echocardiogram on 4/19/2016. Her   stress test showed horizontal ST depression suggestive of ischemia. Her   echocardiogram showed possible mitral stenosis with a peak gradient of 25 mmHg and a   mean gradient of 11 mmHg. She also had a Holter monitor performed on " "4/19/2016   which showed very frequent PVCs with a total of 12,909 isolated events. She also had 2   very brief runs of SVT, 8 and 10 beats respectively.      She underwent a transesophageal echocardiogram to further evaluate the mitral valve   on 5/04/2016. This showed an ejection fraction of 60% to 65% with grade 2 diastolic   dysfunction. However, the posterior mitral valve leaflet was tethered and fixed, resulting   in malcoaptation of the mitral valve leaflets. She had moderate to severe mitral   regurgitation, and significant mitral stenosis with a mean gradient of 9.5 mmHg. The   degree of mitral stenosis was felt to be overestimated secondary to the mitral  regurgitation. 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      The patient was admitted on 5/5/2021 with worsening dyspnea, and pulmonary edema.    It was determined that this was acute on chronic diastolic CHF.  She was transferred to   [facility], and underwent a DAO and right and left heart catheterization on 5/6/2021.  The   DAO only showed mild mitral stenosis and mild mitral regurgitation.  It was felt that this   may have been slightly underestimated, although not severe enough for surgery.  The   left heart catheterization showed angiographically normal coronary arteries.  The right   heart catheterization did show significant pulmonary hypertension with a mean PA   pressure of 40 mmHg, although her pulmonary capillary wedge pressure was also   elevated at 25.  She was diuresed and was eventually discharged with medical therapy.     The patient began to have episodes of palpitations and heart pounding, as well as   feelings of \"emptiness in her stomach\" around October 2021.  She was ultimately   diagnosed with new paroxysmal atrial fibrillation.  She did have episodes where her   heart rate would go fast.  Dr. Bledsoe started Elidiptiis, and " "changed her atenolol to 12.5   mg twice a day.  She also was having trouble with congestive heart failure, mainly from   abdominal edema and shortness of breath.  Her Lasix was increased to 80 mg/day,   and she actually started taking it twice a day.  She diuresed well, and felt much better.      The following portions of the patient's history were reviewed and updated as appropriate: allergies, current medications, past family history, past medical history, past social history, past surgical history and problem list.    Past Medical History:   Diagnosis Date   • Frequent PVCs    • Hypothyroidism    • Mitral regurgitation    • Mitral stenosis    • Papillary fibroelastoma of heart     Small fibroelastoma attached to left coronary cusp of arotic valve by DAO 5/4/16       Past Surgical History:   Procedure Laterality Date   • CARDIAC CATHETERIZATION N/A 5/6/2021    Procedure: Right and Left Heart Cath;  Surgeon: Shannan Casey MD;  Location: [facility] CATH INVASIVE LOCATION;  Service: Cardiovascular;  Laterality: N/A;   • CARPAL TUNNEL RELEASE     • HYSTERECTOMY             ECG 12 Lead    Date/Time: 1/12/2023 12:04 PM  Performed by: Dada Zapata III, MD  Authorized by: Dada Zapata III, MD   Comparison: compared with previous ECG   Similar to previous ECG  Rhythm: sinus rhythm  Rate: normal  Conduction: conduction normal  ST Segments: ST segments normal  T Waves: T waves normal  QRS axis: normal  Other: no other findings    Clinical impression: normal ECG               Objective:     Vitals:    01/12/23 1106   BP: 104/52   Pulse: 65   Weight: 82.3 kg (181 lb 8 oz)   Height: 157.5 cm (62\")         Vitals reviewed.   Constitutional:       General: Not in acute distress.     Appearance: Well-developed. Not diaphoretic.   Eyes:      General:         Right eye: No discharge.         Left eye: No discharge.      Conjunctiva/sclera: Conjunctivae normal.      Pupils: Pupils are equal, round, and reactive to light.   HENT:     "  Head: Normocephalic and atraumatic.      Nose: Nose normal.   Neck:      Thyroid: No thyromegaly.      Trachea: No tracheal deviation.      Lymphadenopathy: No cervical adenopathy.   Pulmonary:      Effort: Pulmonary effort is normal. No respiratory distress.      Breath sounds: Normal breath sounds. No stridor.   Chest:      Chest wall: Not tender to palpatation.   Cardiovascular:      Normal rate. Regular rhythm.      Murmurs: There is no murmur.      . No S3 gallop. No click. No rub.   Pulses:     Intact distal pulses.   Abdominal:      General: Bowel sounds are normal. There is no distension.      Palpations: Abdomen is soft. There is no abdominal mass.      Tenderness: There is no abdominal tenderness. There is no guarding or rebound.   Musculoskeletal: Normal range of motion.         General: No tenderness or deformity.      Cervical back: Normal range of motion and neck supple. Skin:     General: Skin is warm and dry.      Findings: No erythema or rash.   Neurological:      Mental Status: Alert and oriented to person, place, and time.      Deep Tendon Reflexes: Reflexes are normal and symmetric.   Psychiatric:         Thought Content: Thought content normal.         Lab Review:   Lab Results   Component Value Date    GLUCOSE 89 05/07/2021    BUN 22 05/07/2021    CREATININE 0.90 05/07/2021    EGFRIFNONA 63 05/07/2021    BCR 24.4 05/07/2021    K 4.0 05/07/2021    CO2 27.1 05/07/2021    CALCIUM 8.6 05/07/2021    ALBUMIN 4.40 05/05/2021    AST 35 (H) 05/05/2021    ALT 57 (H) 05/05/2021       Lab Results   Component Value Date    PROBNP 479.8 05/05/2021     No components found for: TROP  No results found for: DDIMERQUANT        Cardiac Procedures:  1. 24-hour Holter monitor on 4/19/2016 showed very frequent premature ventricular   contractions with a total of 12,909 events. She did have 2 couplets and 1250 bigeminy   runs. She also had 2 short runs of SVT at 8 and 10 beats respectively.   2. Transesophageal  echocardiogram on 5/4/2016 showed an ejection fraction of 60%.   There was grade 2 diastolic dysfunction. The left atrium was moderately dilated. There   was no evidence of shunting. The right ventricle was normal in size and function. The   posterior mitral valve leaflet was tethered and fixed, resulting in malcoaptation of the mitral   valve leaflets. There was moderate to severe mitral regurgitation. There was up to   moderate to severe mitral stenosis with a mean gradient of 9.5 mmHg (however, this was   possibly overestimated secondary to the mitral regurgitation). There was a small   fibroelastoma on the left coronary cusp of the aortic valve. There was mild tricuspid   regurgitation with a calculated right ventricular systolic pressure of 65 mmHg.    3. Echocardiogram on 12/21/2016 revealed mild LVH.  The ejection fraction was 60-65%.    There was grade II diastolic dysfunction.  The left atrium was moderately dilated.  The   mitral valve leaflets were thickened and possibly rheumatic.  There was doming of the   anterior mitral valve leaflet.  There was moderate mitral regurgitation which was likely   underestimated.    4. Echocardiogram on 12/20/2017: There is mild LVH.  The ejection fraction is 60-65%.    There was grade 2 diastolic dysfunction.  The left atrium was moderately dilated.  The   mitral valve leaflets were thickened and rheumatic.  There was moderate mitral stenosis   with a mean gradient of 7 mmHg.  There was moderate mitral regurgitation.  5. Echocardiogram on 12/28/2018: There was mild LVH.  Ejection fraction was 60 to 65%.    The left atrium was moderately enlarged.  There was a rheumatic mitral valve.  There was   moderate mitral stenosis with a mean gradient of 6.5 mmHg.  There was moderate mitral   regurgitation which was eccentric and posteriorly directed.  There was mild aortic   insufficiency.  6.  DAO on 5/6/2021: The ejection fraction was 60 to 65%.  There was mild mitral stenosis    and mitral regurgitation.  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  The left atrium   was severely enlarged.  The right atrium was moderately to severely enlarged.  The saline   study was negative.  There was grade 2 plaque in the descending aorta.  7.  Left and right heart catheterization on 5/6/2021: The mean PA pressure was 40.  The   pulmonary capillary wedge pressure was 25.  The coronary arteries were angiographically   normal.  8.  Echocardiogram Tyrese daughters December 2021:  Normal left her size, ejection fraction 65-70%, normal regional wall motion, moderate mitral stenosis with a mean gradient of 6 mmHg, mild mitral regurgitation.        Assessment:          Diagnosis Plan   1. History of mitral valve stenosis  ECG 12 Lead      2. Rheumatic mitral regurgitation  ECG 12 Lead      3. Chronic obstructive pulmonary disease, unspecified COPD type ([facility])  ECG 12 Lead             Plan:       1.  Rheumatic mitral stenosis, mild-moderate, mean 6 mmHg most recent echocardiogram, continue to follow  2.  Paroxysmal atrial fibrillation, in sinus rhythm, continue same,  3.  Chronic anticoagulation with apixaban, doing well with no issues  4.  Frequent PVCs  5.  Chronic diastolic congestive heart failure, no evidence of volume overload  6.  Hypertensive heart disease with heart failure, doing well on current medical therapy, creatinine, BUN reviewed.    Thank you very much for allowing us to participate in the care of this pleasant patient.  Please don't hesitate to call if I can be of assistance in any way.      Current Outpatient Medications:   •  amitriptyline (ELAVIL) 100 MG tablet, Take 100 mg by mouth Every Evening., Disp: , Rfl:   •  apixaban (ELIQUIS) 5 MG tablet tablet, Take 5 mg by mouth 2 (Two) Times a Day., Disp: , Rfl:   •  atenolol (TENORMIN) 25 MG tablet, Take 1 tablet by mouth 2 (Two) Times a Day., Disp: 180 tablet, Rfl: 3  •  furosemide (LASIX) 80 MG tablet, TAKE 1 TABLET BY MOUTH EVERY MORNING  AND TAKE 1 TABLET 6 HOURS LATER AS NEEDED, Disp: , Rfl:   •  levothyroxine (SYNTHROID, LEVOTHROID) 125 MCG tablet, Take 250 mcg by mouth Every Morning., Disp: , Rfl:   •  losartan (COZAAR) 25 MG tablet, Take 1 tablet by mouth Every Morning., Disp: 30 tablet, Rfl: 3  •  losartan (COZAAR) 25 MG tablet, Take 1 tablet by mouth Every Morning., Disp: 90 tablet, Rfl: 3  •  metFORMIN (GLUCOPHAGE) 500 MG tablet, Take 500 mg by mouth Daily., Disp: , Rfl:   •  potassium chloride (K-DUR,KLOR-CON) 20 MEQ CR tablet, Take 20 mEq by mouth 2 (Two) Times a Day., Disp: , Rfl:          Return in about 1 year (around 1/12/2024).

## 2023-01-13 ENCOUNTER — TELEPHONE (OUTPATIENT)
Dept: CARDIOLOGY | Facility: CLINIC | Age: 68
End: 2023-01-13
Payer: MEDICARE

## 2023-01-13 NOTE — TELEPHONE ENCOUNTER
Karen from [facility] is calling on behalf of the patient. She states that patient was seen in office yesterday and forgot to mention that she would like to switch to Coumdain if it is ok with Dr Zapata. Karen stated that the Coumadin and the blood testing is covered 100% by patients insurance and she would have her INR testing done at [facility].     Please advise    JESSICA Carbone  01/13/2023

## (undated) DEVICE — PK CATH CARD 40

## (undated) DEVICE — GLIDESHEATH BASIC HYDROPHILIC COATED INTRODUCER SHEATH: Brand: GLIDESHEATH

## (undated) DEVICE — GW EMR FIX EXCHG J STD .035 3MM 260CM

## (undated) DEVICE — GLIDESHEATH SLENDER STAINLESS STEEL KIT: Brand: GLIDESHEATH SLENDER

## (undated) DEVICE — CATH VENT MIV RADL PIG ST TIP 5F 110CM

## (undated) DEVICE — BALN PRESS WEDGE 5F 110CM

## (undated) DEVICE — KT MANIFLD CARDIAC

## (undated) DEVICE — RADIFOCUS OPTITORQUE ANGIOGRAPHIC CATHETER: Brand: OPTITORQUE